# Patient Record
Sex: MALE | Race: WHITE | NOT HISPANIC OR LATINO | Employment: OTHER
[De-identification: names, ages, dates, MRNs, and addresses within clinical notes are randomized per-mention and may not be internally consistent; named-entity substitution may affect disease eponyms.]

---

## 2017-05-12 ENCOUNTER — RECORDS - HEALTHEAST (OUTPATIENT)
Dept: ADMINISTRATIVE | Facility: OTHER | Age: 67
End: 2017-05-12

## 2017-05-12 ENCOUNTER — RECORDS - HEALTHEAST (OUTPATIENT)
Dept: LAB | Facility: CLINIC | Age: 67
End: 2017-05-12

## 2017-05-12 LAB
CHOLEST SERPL-MCNC: 109 MG/DL
FASTING STATUS PATIENT QL REPORTED: YES
HDLC SERPL-MCNC: 28 MG/DL
LDLC SERPL CALC-MCNC: 55 MG/DL
TRIGL SERPL-MCNC: 128 MG/DL

## 2017-05-18 ENCOUNTER — HOSPITAL ENCOUNTER (OUTPATIENT)
Dept: CT IMAGING | Facility: CLINIC | Age: 67
Discharge: HOME | End: 2017-05-18

## 2017-05-18 DIAGNOSIS — R06.02 SOB (SHORTNESS OF BREATH): ICD-10-CM

## 2018-05-02 ENCOUNTER — TRANSFERRED RECORDS (OUTPATIENT)
Dept: HEALTH INFORMATION MANAGEMENT | Facility: CLINIC | Age: 68
End: 2018-05-02

## 2018-05-02 ENCOUNTER — RECORDS - HEALTHEAST (OUTPATIENT)
Dept: LAB | Facility: CLINIC | Age: 68
End: 2018-05-02

## 2018-05-02 LAB
ANION GAP SERPL CALCULATED.3IONS-SCNC: 10 MMOL/L (ref 5–18)
BUN SERPL-MCNC: 27 MG/DL (ref 8–28)
CALCIUM SERPL-MCNC: 9.5 MG/DL (ref 8.5–10.5)
CHLORIDE BLD-SCNC: 111 MMOL/L (ref 98–107)
CHOLEST SERPL-MCNC: 162 MG/DL
CO2 SERPL-SCNC: 23 MMOL/L (ref 22–31)
CREAT SERPL-MCNC: 0.92 MG/DL (ref 0.7–1.3)
FASTING STATUS PATIENT QL REPORTED: NO
GFR SERPL CREATININE-BSD FRML MDRD: >60 ML/MIN/1.73M2
GLUCOSE BLD-MCNC: 101 MG/DL (ref 70–125)
HDLC SERPL-MCNC: 33 MG/DL
LDLC SERPL CALC-MCNC: 106 MG/DL
POTASSIUM BLD-SCNC: 4.5 MMOL/L (ref 3.5–5)
SODIUM SERPL-SCNC: 144 MMOL/L (ref 136–145)
TRIGL SERPL-MCNC: 117 MG/DL

## 2018-05-31 ENCOUNTER — TRANSFERRED RECORDS (OUTPATIENT)
Dept: HEALTH INFORMATION MANAGEMENT | Facility: CLINIC | Age: 68
End: 2018-05-31

## 2018-06-23 ENCOUNTER — TRANSFERRED RECORDS (OUTPATIENT)
Dept: HEALTH INFORMATION MANAGEMENT | Facility: CLINIC | Age: 68
End: 2018-06-23

## 2018-06-28 ENCOUNTER — TRANSFERRED RECORDS (OUTPATIENT)
Dept: HEALTH INFORMATION MANAGEMENT | Facility: CLINIC | Age: 68
End: 2018-06-28

## 2018-08-08 NOTE — TELEPHONE ENCOUNTER
FUTURE VISIT INFORMATION      FUTURE VISIT INFORMATION:    Date: 08/15/2018     Time: 12:30 pm     Location: [facility] [place]   REFERRAL INFORMATION:    Referring provider:  Dr. Herberth Travis    Referring providers clinic:  [facility]    Reason for visit/diagnosis      FUTURE VISIT INFORMATION        RECORDS REQUESTED FROM:       Clinic name Comments Records Status Imaging Status   [facility] Requested Records on 08/08/2018 In-Coming  In-Coming                                    RECORDS STATUS      NOTES (FOR ALL VISITS) STATUS DETAILS   OFFICE NOTE from referring provider Care Everywhere 06/05/2018   OFFICE NOTE from other specialist Care Everywhere 06/15/2018   DISCHARGE SUMMARY from hospital Care Everywhere  05/01/2007   DISCHARGE REPORT from the ER N/A    OPERATIVE REPORT N/A    MEDICATION LIST Internal    IMAGING  (FOR ALL VISITS)     EMG N/A    EEG N/A    ECT N/A    MRI (HEAD, NECK, SPINE) N/A    CT (HEAD, NECK, SPINE) N/A

## 2018-08-15 ENCOUNTER — OFFICE VISIT (OUTPATIENT)
Dept: NEUROLOGY | Facility: CLINIC | Age: 68
End: 2018-08-15
Payer: COMMERCIAL

## 2018-08-15 ENCOUNTER — PRE VISIT (OUTPATIENT)
Dept: NEUROLOGY | Facility: CLINIC | Age: 68
End: 2018-08-15

## 2018-08-15 VITALS
WEIGHT: 193 LBS | SYSTOLIC BLOOD PRESSURE: 147 MMHG | OXYGEN SATURATION: 95 % | TEMPERATURE: 98.1 F | DIASTOLIC BLOOD PRESSURE: 95 MMHG | RESPIRATION RATE: 18 BRPM | HEIGHT: 69 IN | BODY MASS INDEX: 28.58 KG/M2 | HEART RATE: 74 BPM

## 2018-08-15 DIAGNOSIS — G60.3 IDIOPATHIC PROGRESSIVE POLYNEUROPATHY: Primary | ICD-10-CM

## 2018-08-15 RX ORDER — ESOMEPRAZOLE MAGNESIUM 40 MG/1
CAPSULE, DELAYED RELEASE ORAL DAILY
Refills: 2 | COMMUNITY
Start: 2017-11-10

## 2018-08-15 RX ORDER — IRBESARTAN 150 MG/1
TABLET ORAL DAILY
Refills: 2 | COMMUNITY
Start: 2018-06-27

## 2018-08-15 RX ORDER — ASPIRIN 81 MG/1
81 TABLET ORAL DAILY
COMMUNITY
Start: 2011-12-19

## 2018-08-15 ASSESSMENT — ENCOUNTER SYMPTOMS
DIZZINESS: 1
WEAKNESS: 1

## 2018-08-15 ASSESSMENT — PAIN SCALES - GENERAL: PAINLEVEL: NO PAIN (1)

## 2018-08-15 NOTE — NURSING NOTE
Chief Complaint   Patient presents with     Consult     UMP NEW HEADACHES, MILD WORD FINDING PROBLEMS, AND BALANCE     Christopher Sigala, EMT

## 2018-08-15 NOTE — MR AVS SNAPSHOT
After Visit Summary   8/15/2018    Cosme Brown    MRN: [id number]           Patient Information     Date Of Birth          1950        Visit Information        Provider Department      8/15/2018 12:30 PM Carlos Asif MD [facility] Neurology        Today's Diagnoses     Idiopathic progressive polyneuropathy    -  1       Follow-ups after your visit        Follow-up notes from your care team     Return in about 4 weeks (around 9/12/2018).      Your next 10 appointments already scheduled     Sep 12, 2018 10:00 AM CDT   (Arrive by 9:45 AM)   EMG with Mattie Erickson MD   [facility] EMG ([facility] [place])    [address] [place] [id number] [phone]           Do not use lotions or creams on the area to be tested. If you are on blood thinners (Warfarin, Coumadin, Lovenox, etc), please contact your primary care physician to check if it is safe to stop them 3 days prior to testing. If you have anxiety, please check with your referring physician to obtain anti-anxiety medication for the procedure.            Sep 26, 2018 10:00 AM CDT   (Arrive by 9:45 AM)   Return Visit with Carlos Asif MD   [facility] Neurology ([facility])    [address] [place] [id number] [phone]              Future tests that were ordered for you today     Open Future Orders        Priority Expected Expires Ordered    EMG - Needle EMG 2 Extremities (45599) Routine  8/15/2019 8/15/2018            Who to contact     Please call your clinic at [phone] to:    Ask questions about your health    Make or cancel appointments    Discuss your medicines    Learn about your test results    Speak to your doctor            Additional Information About Your Visit        Cannonball[employer] Information     Fusion-io is an electronic gateway that provides easy, online access to your medical records. With [employer], you  "can request a clinic appointment, read your test results, renew a prescription or communicate with your care team.     To sign up for [email or web address] visit the website at [email or web address]   You will be asked to enter the access code listed below, as well as some personal information. Please follow the directions to create your username and password.     Your access code is: [email or web address]  Expires: 10/30/2018  6:30 AM     Your access code will  in 90 days. If you need help or a new code, please contact your [facility] or call [phone] for assistance.        Care EveryWhere ID     This is your Care EveryWhere ID. This could be used by other organizations to access your [facility] medical records  [id number]        Your Vitals Were     Pulse Temperature Respirations Height Pulse Oximetry BMI (Body Mass Index)    74 98.1  F (36.7  C) (Oral) 18 1.753 m (5' 9\") 95% 28.5 kg/m2       Blood Pressure from Last 3 Encounters:   08/15/18 (!) 147/95    Weight from Last 3 Encounters:   08/15/18 87.5 kg (193 lb)               Primary Care Provider    None Specified       No primary provider on file.        Equal Access to Services     FERNANDEZ PRESTON : Jean romoo Soga, waaxda luqadaha, qaybta kaalmada adeegyada, leisa morley . So [facility] [phone].    ATENCIÓN: Si habla español, tiene a thomson disposición servicios gratuitos de asistencia lingüística. Llame al [phone].    We comply with applicable federal civil rights laws and [place] laws. We do not discriminate on the basis of race, color, national origin, age, disability, sex, sexual orientation, or gender identity.            Thank you!     Thank you for choosing [facility] NEUROLOGY  for your care. Our goal is always to provide you with excellent care. Hearing back from our patients is one way we can continue to improve our services. Please take a few minutes to complete the written survey that " you may receive in the mail after your visit with us. Thank you!             Your Updated Medication List - Protect others around you: Learn how to safely use, store and throw away your medicines at www.disposemymeds.org.          This list is accurate as of 8/15/18  2:31 PM.  Always use your most recent med list.                   Brand Name Dispense Instructions for use Diagnosis    aspirin 81 MG EC tablet      Take 81 mg by mouth daily        esomeprazole 40 MG CR capsule    nexIUM     daily        irbesartan 150 MG tablet    AVAPRO     daily

## 2018-08-15 NOTE — LETTER
8/15/2018       RE: Cosme Brown   [address]  [place] [address]     Dear Colleague,    Thank you for referring your patient, Cosme Brown, to the [facility] NEUROLOGY at [facility]. Please see a copy of my visit note below.    Service Date: 08/15/2018      Herberth Travis MD   [id number] [place] [address]      RE: Cosme Brown   MRN: [id number]   : 1950      Dear Herberth:      Thank you for referring Cosme Brown for neurologic consultation on 08/15/2018.  The patient is a 68-year-old man who comes with a chief complaint of imbalance as well as right occipital headache.      The patient has noted over a number of years gradual difficulty with balance.  He first noticed this when fishing in [place] for [employer].  He did tell me he suffered a fall skiing 8 years ago.  He did injure his medial left collateral ligament.  He has had some pain issues since.  He has not suffered any other significant falls, and he denied any head trauma.  He does suffer from hearing loss over the last 6 years.  He is not certain it is worse on the right compared to the left.  He has not had tinnitus.  He possibly has had 2 bouts of vertigo in the last 5-10 years that lasted less than a day.  He has not had any symptoms to suggest upper respiratory tract infections.  He denied any stroke-like symptoms including amaurosis fugax, diplopia, focal weakness, paresthesias or other episodes of true vertigo nor has he had any trouble with speech.  He has never had oscillopsia.  The patient also has had a right occipital headache that is infrequent.  He has had this intermittently and this can last up to a half a day.  He has noted that if he lies down and rests it does help.  The patient has used a firm pillow for some time that is about 6 inches deep.  He does feel somewhat fatigued at times.  He snores but not to any extent.  He has never  "fallen asleep in his office at home or at a stop sign.  Generally he does feel rested and he did note that he had a sleep study that was negative 6-7 years ago.  He has not had any recent memory problems.  He said he may have had some issues at school years ago.  He did well professionally and worked for [employer] and retired from them.  He has never had any trouble passing his urine or stool.  He has noted for over 10 years another problem involving his feet.  He described pain in his feet with mild dysesthetic sensation.  He said that if he touches his feet they feel normal to touch.  He noted that his \"arches were fallen\", the right greater than the left.  He also has had some pain when he walks over 1-2 minutes.  He did go to a foot chiropractor and he is not certain if it helped.  He has seen a podiatrist who suggested surgery.  The patient has been told he may have arthritis too involving his feet.  He can recall in retrospect having fallen arches since he was age 6.  He can recall some type of injury, probably jumping from a height.      I was able to review with him your excellent records.  The patient's chemistry profile was normal from 2017.  He had on 2018 an HDL that was low at 33 and an LDL of 106 with triglycerides of 117 and total cholesterol of 162.  He has had notes in your office that he verified that he has had previous asbestosis exposure.  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  He has had a history of prostate cancer in  and this was done with a robot type of surgery and his PSA has been negative since.  He had herpes zoster involving his face in  and has had no residual.  The patient has had a known Krueger's esophagus.  He has been hypertensive.  He did review his family history, and he noted that he had a paternal uncle who used a cane when he was old and also his dad.  His father  of kidney disease at 86 and his mother of " "chronic obstructive lung disease at 82.  His father may have had a hemorrhage and stroke that terminated his life.  He also noted he had had some type of \"heart blockage\" after a myocardial infarction.  The patient otherwise denied any other relevant family history except for a brother who has had hypertension and gout.  The patient drinks very little alcohol.  He has never smoked.  He has had a normal diet with eating fruits, vegetables and meat.  His weight has been stable.  He has had no problems with constipation or diarrhea.        CURRENT MEDICATIONS:   He continues on:   1.  Centrum Silver.     2.  Cialis.     3.  Low dose aspirin.     4.  Omeprazole.     5.  Crestor.     6.  Irbesartan.        ALLERGIES:  He has allergies listed to lovastatin and he equated this to causing pain in his feet but stopping it did not change his symptoms.      The patient did have an MRI scan of his brain that I reviewed with him.  This was done on 05/31/2018.  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  Otherwise, he had no other significant atrophy unexpected for age.  He had normal MRA of his intracranial arteries.      Neurologic examination revealed a pleasant man.  His blood pressure was 118/80 with a regular pulse.  Gait, station, cerebellar testing, except for some difficulty with tandem with the need to concentrate to do it with negative Romberg, muscle stretch reflexes which were all absent except for trace knee jerks, which are symmetric, plantar stimulation, strength testing, cranial nerve examination, superficial and cortical sensory testing are unremarkable except for decreased sensation to pinprick involving the left sole and the dorsum of his foot to the ankle as well as the same involving the right foot.  The patient had normal vibratory and position sense testing.  He does have mild cataracts on cranial nerve examination.  I could not auscultate cervical " bruits.  He had a regular cardiac rhythm without gallops or murmurs.  He had a negative Nylen-Barany test.      IMPRESSION:   1.  Longstanding history of gait disturbance with findings to suggest midline cerebellar atrophy on MRI scan.   2.  Family history of gait disturbance on his father's side.   3.  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   4.  Headache; possible migraine.     The patient is going to get involved in further therapy.  I have asked him to start balance therapy.  I did suggest that he have appropriate testing done for sensory polyneuropathy including EMG and other blood tests.  I am going to see him back for neurologic followup.        Sincerely yours,       Quyen Asif MD      I spent 1 hour with the patient.  Over 50% of the time this involved counseling and coordination of care.  A complete review of medical systems was done and a positive review is listed in the report above.         QUYEN ASIF MD             D: 2018   T: 2018   MT: GUILLERMO      Name:     EMILY NAPIER   MRN:      [id number]        Account:      [id number]   :      1950           Service Date: 08/15/2018      Document: [id number]        Again, thank you for allowing me to participate in the care of your patient.      Sincerely,    Quyen Asif MD

## 2018-08-15 NOTE — LETTER
Date:August 31, 2018      Patient was self referred, no letter generated. Do not send.        [facility] Health Information

## 2018-08-27 NOTE — PROGRESS NOTES
Service Date: 08/15/2018      Herberth Travis MD   [address] [place] [address]      RE: Cosme Brown   MRN: [id number]   : 1950      Dear Herberth:      Thank you for referring Cosme Brown for neurologic consultation on 08/15/2018.  The patient is a 68-year-old man who comes with a chief complaint of imbalance as well as right occipital headache.      The patient has noted over a number of years gradual difficulty with balance.  He first noticed this when fishing in [place] for [employer].  He did tell me he suffered a fall skiing 8 years ago.  He did injure his medial left collateral ligament.  He has had some pain issues since.  He has not suffered any other significant falls, and he denied any head trauma.  He does suffer from hearing loss over the last 6 years.  He is not certain it is worse on the right compared to the left.  He has not had tinnitus.  He possibly has had 2 bouts of vertigo in the last 5-10 years that lasted less than a day.  He has not had any symptoms to suggest upper respiratory tract infections.  He denied any stroke-like symptoms including amaurosis fugax, diplopia, focal weakness, paresthesias or other episodes of true vertigo nor has he had any trouble with speech.  He has never had oscillopsia.  The patient also has had a right occipital headache that is infrequent.  He has had this intermittently and this can last up to a half a day.  He has noted that if he lies down and rests it does help.  The patient has used a firm pillow for some time that is about 6 inches deep.  He does feel somewhat fatigued at times.  He snores but not to any extent.  He has never fallen asleep in his office at home or at a stop sign.  Generally he does feel rested and he did note that he had a sleep study that was negative 6-7 years ago.  He has not had any recent memory problems.  He said he may have had some issues at school years ago.  He did well  "professionally and worked for [employer] and retired from them.  He has never had any trouble passing his urine or stool.  He has noted for over 10 years another problem involving his feet.  He described pain in his feet with mild dysesthetic sensation.  He said that if he touches his feet they feel normal to touch.  He noted that his \"arches were fallen\", the right greater than the left.  He also has had some pain when he walks over 1-2 minutes.  He did go to a foot chiropractor and he is not certain if it helped.  He has seen a podiatrist who suggested surgery.  The patient has been told he may have arthritis too involving his feet.  He can recall in retrospect having fallen arches since he was age 6.  He can recall some type of injury, probably jumping from a height.      I was able to review with him your excellent records.  The patient's chemistry profile was normal from 2017.  He had on 2018 an HDL that was low at 33 and an LDL of 106 with triglycerides of 117 and total cholesterol of 162.  He has had notes in your office that he verified that he has had previous asbestosis exposure.  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  He has had a history of prostate cancer in  and this was done with a robot type of surgery and his PSA has been negative since.  He had herpes zoster involving his face in  and has had no residual.  The patient has had a known Krueger's esophagus.  He has been hypertensive.  He did review his family history, and he noted that he had a paternal uncle who used a cane when he was old and also his dad.  His father  of kidney disease at 86 and his mother of chronic obstructive lung disease at 82.  His father may have had a hemorrhage and stroke that terminated his life.  He also noted he had had some type of \"heart blockage\" after a myocardial infarction.  The patient otherwise denied any other relevant family history except for a " brother who has had hypertension and gout.  The patient drinks very little alcohol.  He has never smoked.  He has had a normal diet with eating fruits, vegetables and meat.  His weight has been stable.  He has had no problems with constipation or diarrhea.        CURRENT MEDICATIONS:   He continues on:   1.  Centrum Silver.     2.  Cialis.     3.  Low dose aspirin.     4.  Omeprazole.     5.  Crestor.     6.  Irbesartan.        ALLERGIES:  He has allergies listed to lovastatin and he equated this to causing pain in his feet but stopping it did not change his symptoms.      The patient did have an MRI scan of his brain that I reviewed with him.  This was done on 05/31/2018.  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  Otherwise, he had no other significant atrophy unexpected for age.  He had normal MRA of his intracranial arteries.      Neurologic examination revealed a pleasant man.  His blood pressure was 118/80 with a regular pulse.  Gait, station, cerebellar testing, except for some difficulty with tandem with the need to concentrate to do it with negative Romberg, muscle stretch reflexes which were all absent except for trace knee jerks, which are symmetric, plantar stimulation, strength testing, cranial nerve examination, superficial and cortical sensory testing are unremarkable except for decreased sensation to pinprick involving the left sole and the dorsum of his foot to the ankle as well as the same involving the right foot.  The patient had normal vibratory and position sense testing.  He does have mild cataracts on cranial nerve examination.  I could not auscultate cervical bruits.  He had a regular cardiac rhythm without gallops or murmurs.  He had a negative Nylen-Barany test.      IMPRESSION:   1.  Longstanding history of gait disturbance with findings to suggest midline cerebellar atrophy on MRI scan.   2.  Family history of gait disturbance on  his father's side.   3.  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   4.  Headache; possible migraine.     The patient is going to get involved in further therapy.  I have asked him to start balance therapy.  I did suggest that he have appropriate testing done for sensory polyneuropathy including EMG and other blood tests.  I am going to see him back for neurologic followup.        Sincerely yours,       Quyen Asif MD      I spent 1 hour with the patient.  Over 50% of the time this involved counseling and coordination of care.  A complete review of medical systems was done and a positive review is listed in the report above.         QUYEN ASIF MD             D: 2018   T: 2018   MT: GUILLERMO      Name:     EMILY NAPIER   MRN:      [id number]        Account:      [id number]   :      1950           Service Date: 08/15/2018      Document: [id number]

## 2018-09-12 ENCOUNTER — OFFICE VISIT (OUTPATIENT)
Dept: NEUROLOGY | Facility: CLINIC | Age: 68
End: 2018-09-12
Payer: COMMERCIAL

## 2018-09-12 DIAGNOSIS — M62.81 MUSCLE WEAKNESS (GENERALIZED): ICD-10-CM

## 2018-09-12 DIAGNOSIS — R26.81 GAIT INSTABILITY: Primary | ICD-10-CM

## 2018-09-12 NOTE — LETTER
9/12/2018       RE: Cosme Brown  [address]  [facility] [address]     Dear Colleague,    Thank you for referring your patient, Cosme Brown, to the [facility] EMG at [facility]. Please see a copy of my visit note below.      [facility]  Electrodiagnostic Laboratory    Nerve Conduction & EMG Report          Patient: Cosme Brown YOB: 1950  Patient ID: [id number] Age: 68 Years 5 Months  Gender: Male      History & Examination:  Referred by Dr. Asif for evaluation of the bilateral lower extremities due to complaints of gait instability.  The patient reports that 10 year progressive history of gait instability.  He reports that he cannot walk for more than half a mile without developing some discomfort in his right ankle.  He does not appreciate any significant numbness or weakness elsewhere.    He does report he has a history of a right lower extremity fracture 40 years ago.  On examination he does have decreased muscle bulk over the right EDB.  Strength testing is normal in lower extremities except for inability to extend the toes in the right foot except for the great toe.  Fasciculations were visible in both calf muscles.    Techniques:  Sensory and Motor conduction studies were done with surface recording electrodes. Limb temperature was maintained. EMG was done with a concentric needle electrode.     Results:  Sensory nerve conduction studies:  Sensory nerve conduction studies of the sural nerves bilaterally in the right superficial peroneal nerve are considered within normal limits.    Motor nerve conduction studies:  Right deep peroneal stimulation with recording over the extensor digitorum brevis elicited no response.  Stimulation with recording over the anterior tibialis was within normal limits.  Left deep peroneal stimulation and bilateral tibial nerve stimulation were within normal limits.    Right tibial F wave was  within normal limits.      Needle examination:  Needle examination was performed on both the right and left lower extremity.  During insertional activity the patient had fibrillation potentials seen in the extensor hallucis longus on the right.  Fasciculations were present in the bilateral gastrocnemius muscles and in the left leg in the posterior tibialis and tensor fascia selvin.  There was increased insertional activity in the left L5 paraspinals.  With activation some increased amplitude and duration motor unit potentials with a mild reduction in recruitment were seen in the right tibialis anterior, left tibialis anterior, left gastrocnemius, left posterior tibialis, left tensor fascia selvin and left vastus lateralis.    Interpretation:    The EMG does not confirm presence of a sensory neuropathy and those findings are within normal limits.  There are some abnormalities on needle examination that suggest possible chronic neurogenic injury that could best be explained by an L5 radiculopathy although a motor neuropathy of some sort cannot be ruled out.  The absence of the right deep peroneal response with recording over the extensor digitorum brevis could be due to local injury related to past leg fracture but again could represent some form of motor neuropathy and clinical correlation is strongly recommended.  Results were discussed personally with Dr. Asif.    EMG Physician:    Mattie Erickson MD [facility]  Department of Neurology  Pager [phone]             Sensory NCS      Nerve / Sites Rec. Site Onset Peak NP Amp Ref. PP Amp Dist Martin Ref. Temp     ms ms  V  V  V cm m/s m/s  C   R SURAL - Lat Mall 60      Calf Ankle 3.33 4.38 7.5 5.0 10.1 14 42.0 38.0 31.3   L SURAL - Lat Mall 60      Calf Ankle 3.54 4.48 9.6 5.0 12.4 14 39.5 38.0 31.6   R SUP PERONEAL      Lat Leg Hill 3.02 4.01 5.7  8.7 12.5 41.4 38.0 31.9       Motor NCS      Nerve / Sites Rec. Site Lat Ref. Amp Ref. Rel Amp Dist Martin Ref. Dur. Area Temp.      ms ms mV mV % cm m/s m/s ms %  C   R DEEP PERONEAL - EDB 60      Ankle EDB NR 6.00 NR 2.0 NR 8   NR NR 31.5   L DEEP PERONEAL - EDB 60      Ankle EDB 4.27 6.00 5.5 2.0 100 8   6.77 100 31.6   R TIBIAL - AH      Ankle AH 3.85 6.00 7.1 4.0 100 8   6.98 100 31.4      Pop Fos AH 14.53  4.9  68.6 44 41.2 38.0 8.54 91.5 31.4   L TIBIAL - AH      Ankle AH 4.32 6.00 6.9 4.0 100 8   7.24 100 29.4   R PERONEAL - Tib Ant      Fib Head Tib Ant 2.92  5.1  100    10.73 100 31.5      Knee Tib Ant 4.48  5.0  98.8 7 44.8  10.68 98.4 31.5       F  Wave      Nerve Min F Lat Max F Lat Mean FLat Temp.    ms ms ms  C   R TIBIAL 58.12 72.81 64.48 31.4       EMG Summary Table     Spontaneous MUAP Recruitment    IA Fib/PSW Fasc H.F. Amp Dur. PPP Pattern   R. TIB ANTERIOR N None None None 1+ 1+ None Mild Reduced   R. GASTROCN (MED) N None 1+ None N N None Normal   R. EXT ISAAC LONG N 1+ None None N N None Normal   R. VAST LATERALIS N None None None N N None Normal   R. GLUTEUS MED N None None None N N None Normal   R. GLUTEUS MAX N None None None N N None Normal   L. TIB ANTERIOR N None None None 1+ 1+ None Mild Reduced   L. GASTROCN (MED) N None 1+ None 1+ 1+ None Normal   L. TIB POSTERIOR N None 1+ None 1+ 1+ None Mild Reduced   L. T FASCIA SANDRA N None 1+ None 1+ 1+ None Mild Reduced   L. VAST LATERALIS N None None None 1+ 1+ None Normal   L. GLUTEUS MAX N None None None N N None Normal   L. L5 PSP Increased None None None N N None Normal

## 2018-09-12 NOTE — PROGRESS NOTES
[facility]  Electrodiagnostic Laboratory    Nerve Conduction & EMG Report          Patient: Cosme Brown YOB: 1950  Patient ID: [id number] Age: 68 Years 5 Months  Gender: Male      History & Examination:  Referred by Dr. Asif for evaluation of the bilateral lower extremities due to complaints of gait instability.  The patient reports that 10 year progressive history of gait instability.  He reports that he cannot walk for more than half a mile without developing some discomfort in his right ankle.  He does not appreciate any significant numbness or weakness elsewhere.    He does report he has a history of a right lower extremity fracture 40 years ago.  On examination he does have decreased muscle bulk over the right EDB.  Strength testing is normal in lower extremities except for inability to extend the toes in the right foot except for the great toe.  Fasciculations were visible in both calf muscles.    Techniques:  Sensory and Motor conduction studies were done with surface recording electrodes. Limb temperature was maintained. EMG was done with a concentric needle electrode.     Results:  Sensory nerve conduction studies:  Sensory nerve conduction studies of the sural nerves bilaterally in the right superficial peroneal nerve are considered within normal limits.    Motor nerve conduction studies:  Right deep peroneal stimulation with recording over the extensor digitorum brevis elicited no response.  Stimulation with recording over the anterior tibialis was within normal limits.  Left deep peroneal stimulation and bilateral tibial nerve stimulation were within normal limits.    Right tibial F wave was within normal limits.      Needle examination:  Needle examination was performed on both the right and left lower extremity.  During insertional activity the patient had fibrillation potentials seen in the extensor hallucis longus on the right.  Fasciculations were present in  the bilateral gastrocnemius muscles and in the left leg in the posterior tibialis and tensor fascia selvin.  There was increased insertional activity in the left L5 paraspinals.  With activation some increased amplitude and duration motor unit potentials with a mild reduction in recruitment were seen in the right tibialis anterior, left tibialis anterior, left gastrocnemius, left posterior tibialis, left tensor fascia selvin and left vastus lateralis.    Interpretation:    The EMG does not confirm presence of a sensory neuropathy and those findings are within normal limits.  There are some abnormalities on needle examination that suggest possible chronic neurogenic injury that could best be explained by an L5 radiculopathy although a motor neuropathy of some sort cannot be ruled out.  The absence of the right deep peroneal response with recording over the extensor digitorum brevis could be due to local injury related to past leg fracture but again could represent some form of motor neuropathy and clinical correlation is strongly recommended.  Results were discussed personally with Dr. Asif.    EMG Physician:    Mattie Erickson MD [facility]  Department of Neurology  Pager [phone]             Sensory NCS      Nerve / Sites Rec. Site Onset Peak NP Amp Ref. PP Amp Dist Martin Ref. Temp     ms ms  V  V  V cm m/s m/s  C   R SURAL - Lat Mall 60      Calf Ankle 3.33 4.38 7.5 5.0 10.1 14 42.0 38.0 31.3   L SURAL - Lat Mall 60      Calf Ankle 3.54 4.48 9.6 5.0 12.4 14 39.5 38.0 31.6   R SUP PERONEAL      Lat Leg Hill 3.02 4.01 5.7  8.7 12.5 41.4 38.0 31.9       Motor NCS      Nerve / Sites Rec. Site Lat Ref. Amp Ref. Rel Amp Dist Martin Ref. Dur. Area Temp.     ms ms mV mV % cm m/s m/s ms %  C   R DEEP PERONEAL - EDB 60      Ankle EDB NR 6.00 NR 2.0 NR 8   NR NR 31.5   L DEEP PERONEAL - EDB 60      Ankle EDB 4.27 6.00 5.5 2.0 100 8   6.77 100 31.6   R TIBIAL - AH      Ankle AH 3.85 6.00 7.1 4.0 100 8   6.98 100 31.4      Pop Fos AH  14.53  4.9  68.6 44 41.2 38.0 8.54 91.5 31.4   L TIBIAL - AH      Ankle AH 4.32 6.00 6.9 4.0 100 8   7.24 100 29.4   R PERONEAL - Tib Ant      Fib Head Tib Ant 2.92  5.1  100    10.73 100 31.5      Knee Tib Ant 4.48  5.0  98.8 7 44.8  10.68 98.4 31.5       F  Wave      Nerve Min F Lat Max F Lat Mean FLat Temp.    ms ms ms  C   R TIBIAL 58.12 72.81 64.48 31.4       EMG Summary Table     Spontaneous MUAP Recruitment    IA Fib/PSW Fasc H.F. Amp Dur. PPP Pattern   R. TIB ANTERIOR N None None None 1+ 1+ None Mild Reduced   R. GASTROCN (MED) N None 1+ None N N None Normal   R. EXT ISAAC LONG N 1+ None None N N None Normal   R. VAST LATERALIS N None None None N N None Normal   R. GLUTEUS MED N None None None N N None Normal   R. GLUTEUS MAX N None None None N N None Normal   L. TIB ANTERIOR N None None None 1+ 1+ None Mild Reduced   L. GASTROCN (MED) N None 1+ None 1+ 1+ None Normal   L. TIB POSTERIOR N None 1+ None 1+ 1+ None Mild Reduced   L. T FASCIA SANDRA N None 1+ None 1+ 1+ None Mild Reduced   L. VAST LATERALIS N None None None 1+ 1+ None Normal   L. GLUTEUS MAX N None None None N N None Normal   L. L5 PSP Increased None None None N N None Normal

## 2018-09-12 NOTE — MR AVS SNAPSHOT
After Visit Summary   2018    Cosme Brown    MRN: [id number]           Patient Information     Date Of Birth          1950        Visit Information        Provider Department      2018 10:00 AM Mattie Erickson MD [facility] EMG        Today's Diagnoses     Gait instability    -  1    Muscle weakness (generalized)           Follow-ups after your visit        Your next 10 appointments already scheduled     Sep 26, 2018 10:00 AM CDT   (Arrive by 9:45 AM)   Return Visit with Carlos Asif MD   [facility] Neurology ([facility])    [address] [place] [id number] [phone]              Future tests that were ordered for you today     Open Future Orders        Priority Expected Expires Ordered    HC NEEDLE EMG EA EXTREMTY W/PARASPINAL AREA COMPLETE Routine  2019            Who to contact     Please call your clinic at [phone] to:    Ask questions about your health    Make or cancel appointments    Discuss your medicines    Learn about your test results    Speak to your doctor            Additional Information About Your Visit        [employer] Information     [employer] is an electronic gateway that provides easy, online access to your medical records. With [employer]ar, you can request a clinic appointment, read your test results, renew a prescription or communicate with your care team.     To sign up for [employer]ar visit the website at [email or web address]   You will be asked to enter the access code listed below, as well as some personal information. Please follow the directions to create your username and password.     Your access code is: [email or web address]  Expires: 10/30/2018  6:30 AM     Your access code will  in 90 days. If you need help or a new code, please contact your [facility] or call [phone] for assistance.        Care EveryWhere ID     This is your  Care EveryWhere ID. This could be used by other organizations to access your [place] medical records  [id number]         Blood Pressure from Last 3 Encounters:   08/15/18 (!) 147/95    Weight from Last 3 Encounters:   08/15/18 87.5 kg (193 lb)              We Performed the Following     HC NCS MOTOR W OR W/O F-WAVE, 7 OR 8        Primary Care Provider    None Specified       No primary provider on file.        Equal Access to Services     [facility] : Hadii aad ku haddanielao Soidalmisali, waaxda luqadaha, qaybta kaalmada adejeremiyada, waxay idiin haybuddyomega stevensonduaneja morley . So [facility] [phone].    ATENCIÓN: Si habla español, tiene a thomson disposición servicios gratuitos de asistencia lingüística. Llame al [phone].    We comply with applicable federal civil rights laws and [place] laws. We do not discriminate on the basis of race, color, national origin, age, disability, sex, sexual orientation, or gender identity.            Thank you!     Thank you for choosing [facility]  for your care. Our goal is always to provide you with excellent care. Hearing back from our patients is one way we can continue to improve our services. Please take a few minutes to complete the written survey that you may receive in the mail after your visit with us. Thank you!             Your Updated Medication List - Protect others around you: Learn how to safely use, store and throw away your medicines at www.disposemymeds.org.          This list is accurate as of 9/12/18  6:42 PM.  Always use your most recent med list.                   Brand Name Dispense Instructions for use Diagnosis    aspirin 81 MG EC tablet      Take 81 mg by mouth daily        esomeprazole 40 MG CR capsule    nexIUM     daily        irbesartan 150 MG tablet    AVAPRO     daily

## 2018-09-14 ENCOUNTER — TELEPHONE (OUTPATIENT)
Dept: NEUROLOGY | Facility: CLINIC | Age: 68
End: 2018-09-14

## 2018-09-14 DIAGNOSIS — R94.131 ABNORMAL EMG: Primary | ICD-10-CM

## 2018-09-24 ENCOUNTER — HOSPITAL ENCOUNTER (OUTPATIENT)
Dept: MRI IMAGING | Facility: CLINIC | Age: 68
Discharge: HOME | End: 2018-09-24
Attending: PSYCHIATRY & NEUROLOGY | Admitting: PSYCHIATRY & NEUROLOGY
Payer: MEDICARE

## 2018-09-24 DIAGNOSIS — R94.131 ABNORMAL EMG: ICD-10-CM

## 2018-09-24 PROCEDURE — 72148 MRI LUMBAR SPINE W/O DYE: CPT

## 2018-09-26 ENCOUNTER — OFFICE VISIT (OUTPATIENT)
Dept: NEUROLOGY | Facility: CLINIC | Age: 68
End: 2018-09-26
Payer: COMMERCIAL

## 2018-09-26 VITALS
OXYGEN SATURATION: 95 % | BODY MASS INDEX: 30.87 KG/M2 | DIASTOLIC BLOOD PRESSURE: 93 MMHG | HEART RATE: 115 BPM | TEMPERATURE: 98 F | WEIGHT: 196.7 LBS | HEIGHT: 67 IN | SYSTOLIC BLOOD PRESSURE: 140 MMHG

## 2018-09-26 DIAGNOSIS — R94.131 ABNORMAL EMG: ICD-10-CM

## 2018-09-26 DIAGNOSIS — C61 PROSTATE CANCER (H): ICD-10-CM

## 2018-09-26 DIAGNOSIS — R29.898 LEG WEAKNESS, BILATERAL: ICD-10-CM

## 2018-09-26 DIAGNOSIS — R20.2 PARESTHESIA: ICD-10-CM

## 2018-09-26 DIAGNOSIS — E55.9 VITAMIN D DEFICIENCY: ICD-10-CM

## 2018-09-26 DIAGNOSIS — R29.898 LEG WEAKNESS, BILATERAL: Primary | ICD-10-CM

## 2018-09-26 LAB
DEPRECATED CALCIDIOL+CALCIFEROL SERPL-MC: 16 UG/L (ref 20–75)
T4 FREE SERPL-MCNC: 0.93 NG/DL (ref 0.76–1.46)
TSH SERPL DL<=0.005 MIU/L-ACNC: 0.72 MU/L (ref 0.4–4)
VIT B12 SERPL-MCNC: 693 PG/ML (ref 193–986)

## 2018-09-26 ASSESSMENT — PAIN SCALES - GENERAL: PAINLEVEL: NO PAIN (0)

## 2018-09-26 NOTE — LETTER
2018       RE: Cosme Brown   [address] [place] [address]     Dear Colleague,    Thank you for referring your patient, Cosme Brown, to the [facility] NEUROLOGY at [facility]. Please see a copy of my visit note below.    Service Date: 2018      Herberth Travis MD   [id number] [facility]   [place] [address]      RE: Cosme Brown   MRN: [id number]   : 1950      Dear Herberth:      This is in regard to follow up on Cosme Brown.  The patient returns today with a chief complaint of imbalance as well as sensory disturbance involving his feet and right occipital headache.      The patient underwent testing at my suggestion.  His EMG study done by Dr. Mattie Erickson here at [facility] showed that he had changes to suggest a possible chronic neurogenic injury best explained by an L5 radiculopathy.  She could not rule out a motor neuropathy.  The patient did have increased insertional activity in the left L5 paraspinal muscles and also had abnormality noted with larger units in the right tibialis anterior, left tibialis anterior, left gastrocnemius, left posterior tibialis, and left tensor fascia selvin and left vastus lateralis.  I went over the actual study with him.  This did suggest that he could have a change in the lumbar spine to explain some of his symptoms.  I did leave a message with his phone concerning these findings and reviewed them again.  This led to the patient getting an MRI scan of the lumbar spine here at [facility].  It showed some abnormality, but it was mild involving the L4-L5 foramina bilaterally.  He did not have any areas to suggest significant stenosis.  With these MRI scan findings not probably explaining his EMG findings, I did suggest that he go ahead now and have MRI scans of the cervical and thoracic spine.  He did bring up issues with me concerning his concern with insect exposure with camping,  especially a number of years ago when he was spending a lot of time outdoors near [place].  He can recall 1 episode 10-12 years ago where he had low-grade flu-like symptoms after camping there after mosquito bites.  He had concerns about different viral etiologies to neurologic symptoms.  I reassured him that he probably did not have ongoing symptoms from any type of insect related illness from that time.  Hopefully, I was able to reassure him.  He is aware that exercise will probably help his balance and he is willing to go ahead with it.  I have talked with him about vascular risk factors that he has had and trying to get his LDL cholesterol to a lower level.  I have suggested he now start eating Benecol, a plant-based margin and consuming kiwi on a frequent basis.  I explained how these dietary interventions could lower his LDL.  The patient has no serologic risk factors.  I did ask that he have further blood studies and reviewed with him other studies that had been done here at [facility] that should be available through the Epic website.  I made sure he had copies.  I reviewed with him his consultation of 08/15/2018 and he said it was correct.  He is going to return for followup after undergoing further MRI scan and blood studies.      I explained to the patient.  I do not have an exact diagnosis.  I felt some of his EMG changes could relate to L5 radiculopathies.  The patient does not probably have a surgical type of finding involving foraminal narrowing to explain his leg symptoms.      Thank you for allowing me to see this patient.       Sincerely       Quyen Asif MD       I spent 20 minutes with the patient today.  Over 50% of the time this involved counseling and coordination of care         QUYEN ASIF MD             D: 2018   T: 10/01/2018   MT: GUILLERMO      Name:     EMILY NAPIER   MRN:      [id number]        Account:      [id number]   :      1950           Service Date:  09/26/2018      Document: [id number]       Again, thank you for allowing me to participate in the care of your patient.      Sincerely,    Carlos Asif MD

## 2018-09-26 NOTE — NURSING NOTE
Chief Complaint   Patient presents with     RECHECK     [facility] RETURN EMG RESULTS       Karmen Campoverde, EMT

## 2018-09-26 NOTE — MR AVS SNAPSHOT
After Visit Summary   9/26/2018    Cosme Brown    MRN: [id number]           Patient Information     Date Of Birth          1950        Visit Information        Provider Department      9/26/2018 10:00 AM Carlos Asif MD  Health Neurology        Today's Diagnoses     Leg weakness, bilateral    -  1    Paresthesia        Abnormal EMG        Prostate cancer (H)        Vitamin D deficiency           Follow-ups after your visit        Follow-up notes from your care team     Return in about 4 weeks (around 10/24/2018).      Your next 10 appointments already scheduled     Oct 04, 2018  1:00 PM CDT   MR CERVICAL SPINE W/O & W CONTRAST with UUMR2   [facility], [place], MRI ([facility], [facility])    [address] [facility] [address] [address] [phone]           How do I prepare for my exam? (Food and drink instructions) **If you will be receiving sedation or general anesthesia, please see special notes below.**  How do I prepare for my exam? (Other instructions) Take your medicines as usual, unless your doctor tells you not to. You may or may not receive intravenous (IV) contrast for this exam pending the discretion of the Radiologist.  You do not need to do anything special to prepare.  **If you will be receiving sedation or general anesthesia, please see special notes below.**  What should I wear: The MRI machine uses a strong magnet. Please wear clothes without metal (snaps, zippers). A sweatsuit works well, or we may give you a hospital gown. Please remove any body piercings and hair extensions before you arrive. You will also remove watches, jewelry, hairpins, wallets, dentures, partial dental plates and hearing aids. You may wear contact lenses, and you may be able to wear your rings. We have a safe place to keep your personal items, but it is safer to leave them at home.  How long does the exam take: Most tests take 30 to 60 minutes.   HOWEVER, IF YOUR DOCTOR PRESCRIBES ANESTHESIA please plan on spending four to five hours in the recovery room.  What should I bring:  Bring a list of your current medicines to your exam (including vitamins, minerals and over-the-counter drugs).  Do I need a :  **If you will be receiving sedation or general anesthesia, please see special notes below.**  What should I do after the exam: No Restrictions, You may resume normal activities.  What is this test: MRI (magnetic resonance imaging) uses a strong magnet and radio waves to look inside the body. An MRA (magnetic resonance angiogram) does the same thing, but it lets us look at your blood vessels. A computer turns the radio waves into pictures showing cross sections of the body, much like slices of bread. This helps us see any problems more clearly. You may receive fluid (called  contrast ) before or during your scan. The fluid helps us see the pictures better. We give the fluid through an IV (small needle in your arm).  Who should I call with questions:  Please call the Imaging Department at your exam site with any questions. Directions, parking instructions, and other information is available on our website, [email or web address].  How do I prepare if I m having sedation or anesthesia? **IMPORTANT** THE INSTRUCTIONS BELOW ARE ONLY FOR THOSE PATIENTS WHO HAVE BEEN TOLD THEY WILL RECEIVE SEDATION OR GENERAL ANESTHESIA DURING THEIR MRI PROCEDURE:  IF YOU WILL RECEIVE SEDATION (take medicine to help you relax during your exam): You must get the medicine from your doctor before you arrive. Bring the medicine to the exam. Do not take it at home. Arrive one hour early. Bring someone who can take you home after the test. Your medicine will make you sleepy. After the exam, you may not drive, take a bus or take a taxi by yourself. No eating 8 hours before your exam. You may have clear liquids up until 4 hours before your exam. (Clear liquids include water, clear tea,  black coffee and fruit juice without pulp.)  IF YOU WILL RECEIVE ANESTHESIA (be asleep for your exam): Arrive 1 1/2 hours early. Bring someone who can take you home after the test. You may not drive, take a bus or take a taxi by yourself. No eating 8 hours before your exam. You may have clear liquids up until 4 hours before your exam. (Clear liquids include water, clear tea, black coffee and fruit juice without pulp.)            Oct 04, 2018  2:00 PM CDT   MR THORACIC SPINE W/O & W CONTRAST with UUMR2   [facility], [place], MRI ([facility], [facility])    [address] [id number] [phone]           How do I prepare for my exam? (Food and drink instructions) **If you will be receiving sedation or general anesthesia, please see special notes below.**  How do I prepare for my exam? (Other instructions) Take your medicines as usual, unless your doctor tells you not to. You may or may not receive intravenous (IV) contrast for this exam pending the discretion of the Radiologist.  You do not need to do anything special to prepare.  **If you will be receiving sedation or general anesthesia, please see special notes below.**  What should I wear: The MRI machine uses a strong magnet. Please wear clothes without metal (snaps, zippers). A sweatsuit works well, or we may give you a hospital gown. Please remove any body piercings and hair extensions before you arrive. You will also remove watches, jewelry, hairpins, wallets, dentures, partial dental plates and hearing aids. You may wear contact lenses, and you may be able to wear your rings. We have a safe place to keep your personal items, but it is safer to leave them at home.  How long does the exam take: Most tests take 30 to 60 minutes.  HOWEVER, IF YOUR DOCTOR PRESCRIBES ANESTHESIA please plan on spending four to five hours in the recovery room.  What should I bring:  Bring a list of your current medicines to  your exam (including vitamins, minerals and over-the-counter drugs).  Do I need a :  **If you will be receiving sedation or general anesthesia, please see special notes below.**  What should I do after the exam: No Restrictions, You may resume normal activities.  What is this test: MRI (magnetic resonance imaging) uses a strong magnet and radio waves to look inside the body. An MRA (magnetic resonance angiogram) does the same thing, but it lets us look at your blood vessels. A computer turns the radio waves into pictures showing cross sections of the body, much like slices of bread. This helps us see any problems more clearly. You may receive fluid (called  contrast ) before or during your scan. The fluid helps us see the pictures better. We give the fluid through an IV (small needle in your arm).  Who should I call with questions:  Please call the Imaging Department at your exam site with any questions. Directions, parking instructions, and other information is available on our website, [email or web address].  How do I prepare if I m having sedation or anesthesia? **IMPORTANT** THE INSTRUCTIONS BELOW ARE ONLY FOR THOSE PATIENTS WHO HAVE BEEN TOLD THEY WILL RECEIVE SEDATION OR GENERAL ANESTHESIA DURING THEIR MRI PROCEDURE:  IF YOU WILL RECEIVE SEDATION (take medicine to help you relax during your exam): You must get the medicine from your doctor before you arrive. Bring the medicine to the exam. Do not take it at home. Arrive one hour early. Bring someone who can take you home after the test. Your medicine will make you sleepy. After the exam, you may not drive, take a bus or take a taxi by yourself. No eating 8 hours before your exam. You may have clear liquids up until 4 hours before your exam. (Clear liquids include water, clear tea, black coffee and fruit juice without pulp.)  IF YOU WILL RECEIVE ANESTHESIA (be asleep for your exam): Arrive 1 1/2 hours early. Bring someone who can take you home after the  "test. You may not drive, take a bus or take a taxi by yourself. No eating 8 hours before your exam. You may have clear liquids up until 4 hours before your exam. (Clear liquids include water, clear tea, black coffee and fruit juice without pulp.)            Nov 08, 2018  9:30 AM CST   (Arrive by 9:15 AM)   Return Visit with Carlos Asif MD   [facility] Neurology ([facility])    [address] [place] [address] [phone]              Future tests that were ordered for you today     Open Future Orders        Priority Expected Expires Ordered    TSH Routine 9/26/2018 9/26/2019 9/26/2018    T4 free Routine 9/26/2018 9/26/2019 9/26/2018    Vitamin D Deficiency Routine 9/26/2018 9/26/2019 9/26/2018    Protein Immunofixation Serum Routine 9/26/2018 9/26/2019 9/26/2018    Paraneoplastic antibody Routine 9/26/2018 9/26/2019 9/26/2018    Methylmalonic acid Routine 9/26/2018 9/26/2019 9/26/2018    Vitamin B12 Routine 9/26/2018 9/26/2019 9/26/2018    MRI Cervical spine w & w/o contrast Routine  9/26/2019 9/26/2018    MRI Thoracic spine w & w/o contrast Routine  9/26/2019 9/26/2018            Who to contact     Please call your clinic at [phone] to:    Ask questions about your health    Make or cancel appointments    Discuss your medicines    Learn about your test results    Speak to your doctor            Additional Information About Your Visit        Care EveryWhere ID     This is your Care EveryWhere ID. This could be used by other organizations to access your [place] medical records  [id number]        Your Vitals Were     Pulse Temperature Height Pulse Oximetry BMI (Body Mass Index)       115 98  F (36.7  C) (Oral) 1.702 m (5' 7\") 95% 30.81 kg/m2        Blood Pressure from Last 3 Encounters:   09/26/18 (!) 140/93   08/15/18 (!) 147/95    Weight from Last 3 Encounters:   09/26/18 89.2 kg (196 lb 11.2 oz)   08/15/18 87.5 kg (193 lb)               Primary " Care Provider    None Specified       No primary provider on file.        Equal Access to Services     FERNANDEZ PRESTON : Hadii jin Olivares, alejandra jackson, leisa morfin. So [facility] [place] [phone].    ATENCIÓN: Si habla español, tiene a thomson disposición servicios gratuitos de asistencia lingüística. Llame al [phone].    We comply with applicable federal civil rights laws and [place] laws. We do not discriminate on the basis of race, color, national origin, age, disability, sex, sexual orientation, or gender identity.            Thank you!     Thank you for choosing [facility] NEUROLOGY  for your care. Our goal is always to provide you with excellent care. Hearing back from our patients is one way we can continue to improve our services. Please take a few minutes to complete the written survey that you may receive in the mail after your visit with us. Thank you!             Your Updated Medication List - Protect others around you: Learn how to safely use, store and throw away your medicines at www.disposemymeds.org.          This list is accurate as of 9/26/18 11:20 AM.  Always use your most recent med list.                   Brand Name Dispense Instructions for use Diagnosis    aspirin 81 MG EC tablet      Take 81 mg by mouth daily        esomeprazole 40 MG CR capsule    nexIUM     daily        irbesartan 150 MG tablet    AVAPRO     daily

## 2018-09-26 NOTE — LETTER
Date:October 4, 2018      Patient was self referred, no letter generated. Do not send.        [facility] Health Information

## 2018-09-27 LAB
IGA SERPL-MCNC: 74 MG/DL (ref 70–380)
IGG SERPL-MCNC: 771 MG/DL (ref 695–1620)
IGM SERPL-MCNC: 27 MG/DL (ref 60–265)
PROT PATTERN SERPL IFE-IMP: ABNORMAL

## 2018-09-29 LAB — METHYLMALONATE SERPL-SCNC: 0.36 UMOL/L (ref 0–0.4)

## 2018-10-01 NOTE — PROGRESS NOTES
Service Date: 2018      Herberth Travis MD   [id number] [place] [address]      RE: Cosme Brown   MRN: [id number]   : 1950      Dear Herberth:      This is in regard to follow up on Cosme Brown.  The patient returns today with a chief complaint of imbalance as well as sensory disturbance involving his feet and right occipital headache.      The patient underwent testing at my suggestion.  His EMG study done by Dr. Mattie Erickson here at [facility] showed that he had changes to suggest a possible chronic neurogenic injury best explained by an L5 radiculopathy.  She could not rule out a motor neuropathy.  The patient did have increased insertional activity in the left L5 paraspinal muscles and also had abnormality noted with larger units in the right tibialis anterior, left tibialis anterior, left gastrocnemius, left posterior tibialis, and left tensor fascia selvin and left vastus lateralis.  I went over the actual study with him.  This did suggest that he could have a change in the lumbar spine to explain some of his symptoms.  I did leave a message with his phone concerning these findings and reviewed them again.  This led to the patient getting an MRI scan of the lumbar spine here at [facility].  It showed some abnormality, but it was mild involving the L4-L5 foramina bilaterally.  He did not have any areas to suggest significant stenosis.  With these MRI scan findings not probably explaining his EMG findings, I did suggest that he go ahead now and have MRI scans of the cervical and thoracic spine.  He did bring up issues with me concerning his concern with insect exposure with camping, especially a number of years ago when he was spending a lot of time outdoors near [place].  He can recall 1 episode 10-12 years ago where he had low-grade flu-like symptoms after camping there after mosquito bites.  He had concerns about different viral etiologies to neurologic symptoms.  I  reassured him that he probably did not have ongoing symptoms from any type of insect related illness from that time.  Hopefully, I was able to reassure him.  He is aware that exercise will probably help his balance and he is willing to go ahead with it.  I have talked with him about vascular risk factors that he has had and trying to get his LDL cholesterol to a lower level.  I have suggested he now start eating Benecol, a plant-based margin and consuming kiwi on a frequent basis.  I explained how these dietary interventions could lower his LDL.  The patient has no serologic risk factors.  I did ask that he have further blood studies and reviewed with him other studies that had been done here at [facility] that should be available through the Epic website.  I made sure he had copies.  I reviewed with him his consultation of 08/15/2018 and he said it was correct.  He is going to return for followup after undergoing further MRI scan and blood studies.      I explained to the patient.  I do not have an exact diagnosis.  I felt some of his EMG changes could relate to L5 radiculopathies.  The patient does not probably have a surgical type of finding involving foraminal narrowing to explain his leg symptoms.      Thank you for allowing me to see this patient.       Sincerely       Quyen Asif MD       I spent 20 minutes with the patient today.  Over 50% of the time this involved counseling and coordination of care         QUYEN ASIF MD             D: 2018   T: 10/01/2018   MT: GUILLERMO      Name:     EMILY NAPIER   MRN:      [id number]        Account:      [id number]   :      1950           Service Date: 2018      Document: [id number]

## 2018-10-03 DIAGNOSIS — E55.9 VITAMIN D DEFICIENCY: Primary | ICD-10-CM

## 2018-10-03 RX ORDER — ERGOCALCIFEROL 1.25 MG/1
50000 CAPSULE, LIQUID FILLED ORAL
Qty: 8 CAPSULE | Refills: 0 | Status: SHIPPED | OUTPATIENT
Start: 2018-10-03 | End: 2018-11-22

## 2018-10-04 ENCOUNTER — CARE COORDINATION (OUTPATIENT)
Dept: NEUROLOGY | Facility: CLINIC | Age: 68
End: 2018-10-04

## 2018-10-04 ENCOUNTER — HOSPITAL ENCOUNTER (OUTPATIENT)
Dept: MRI IMAGING | Facility: CLINIC | Age: 68
End: 2018-10-04
Attending: PSYCHIATRY & NEUROLOGY
Payer: MEDICARE

## 2018-10-04 ENCOUNTER — HOSPITAL ENCOUNTER (OUTPATIENT)
Dept: MRI IMAGING | Facility: CLINIC | Age: 68
Discharge: HOME | End: 2018-10-04
Attending: PSYCHIATRY & NEUROLOGY | Admitting: PSYCHIATRY & NEUROLOGY
Payer: MEDICARE

## 2018-10-04 DIAGNOSIS — R94.131 ABNORMAL EMG: ICD-10-CM

## 2018-10-04 DIAGNOSIS — R20.2 PARESTHESIA: ICD-10-CM

## 2018-10-04 DIAGNOSIS — C61 PROSTATE CANCER (H): ICD-10-CM

## 2018-10-04 DIAGNOSIS — E55.9 VITAMIN D DEFICIENCY: ICD-10-CM

## 2018-10-04 DIAGNOSIS — R29.898 LEG WEAKNESS, BILATERAL: ICD-10-CM

## 2018-10-04 LAB
CREAT BLD-MCNC: 1 MG/DL (ref 0.66–1.25)
GFR SERPL CREATININE-BSD FRML MDRD: 74 ML/MIN/1.7M2

## 2018-10-04 PROCEDURE — 82565 ASSAY OF CREATININE: CPT

## 2018-10-04 PROCEDURE — A9585 GADOBUTROL INJECTION: HCPCS | Performed by: PSYCHIATRY & NEUROLOGY

## 2018-10-04 PROCEDURE — 25500064 ZZH RX 255 OP 636: Performed by: PSYCHIATRY & NEUROLOGY

## 2018-10-04 PROCEDURE — 72156 MRI NECK SPINE W/O & W/DYE: CPT

## 2018-10-04 PROCEDURE — 72157 MRI CHEST SPINE W/O & W/DYE: CPT

## 2018-10-04 RX ORDER — GADOBUTROL 604.72 MG/ML
10 INJECTION INTRAVENOUS ONCE
Status: COMPLETED | OUTPATIENT
Start: 2018-10-04 | End: 2018-10-04

## 2018-10-04 RX ADMIN — GADOBUTROL 10 ML: 604.72 INJECTION INTRAVENOUS at 14:35

## 2018-10-04 NOTE — PROGRESS NOTES
----- Message -----      From: Carlos Asif MD      Sent: 10/3/2018   7:44 AM        To: Sharon Tello RN     Vit d level 16; will order replacement   =====================================================================    10/4/18: called and left voicemail message for patient with above message from Dr. Asif. Left our contact info incase he has any questions/concerns.

## 2018-10-05 ENCOUNTER — CARE COORDINATION (OUTPATIENT)
Dept: NEUROLOGY | Facility: CLINIC | Age: 68
End: 2018-10-05

## 2018-10-05 LAB — PNP ABY SERUM: NORMAL

## 2018-10-05 NOTE — PROGRESS NOTES
Carlos Asif MD McAllister, Angela, RN                   Tell him mri's show some spondylosis in c spine, but no spinal cord isssue or cause for his sx's; keep follow up appt         10/5/18: called and left voicemail message for patient with above message from Dr. Asif. Left contact info incase he has any questions/concerns.

## 2018-10-17 ENCOUNTER — RECORDS - HEALTHEAST (OUTPATIENT)
Dept: LAB | Facility: CLINIC | Age: 68
End: 2018-10-17

## 2018-10-17 LAB
ANION GAP SERPL CALCULATED.3IONS-SCNC: 14 MMOL/L (ref 5–18)
BUN SERPL-MCNC: 16 MG/DL (ref 8–28)
CALCIUM SERPL-MCNC: 9.6 MG/DL (ref 8.5–10.5)
CHLORIDE BLD-SCNC: 105 MMOL/L (ref 98–107)
CHOLEST SERPL-MCNC: 122 MG/DL
CO2 SERPL-SCNC: 21 MMOL/L (ref 22–31)
CREAT SERPL-MCNC: 1.02 MG/DL (ref 0.7–1.3)
FASTING STATUS PATIENT QL REPORTED: NO
GFR SERPL CREATININE-BSD FRML MDRD: >60 ML/MIN/1.73M2
GLUCOSE BLD-MCNC: 99 MG/DL (ref 70–125)
HDLC SERPL-MCNC: 34 MG/DL
LDLC SERPL CALC-MCNC: 58 MG/DL
POTASSIUM BLD-SCNC: 3.8 MMOL/L (ref 3.5–5)
SODIUM SERPL-SCNC: 140 MMOL/L (ref 136–145)
TRIGL SERPL-MCNC: 148 MG/DL

## 2018-10-18 LAB — 25(OH)D3 SERPL-MCNC: 26.8 NG/ML (ref 30–80)

## 2018-11-08 ENCOUNTER — OFFICE VISIT (OUTPATIENT)
Dept: NEUROLOGY | Facility: CLINIC | Age: 68
End: 2018-11-08
Payer: COMMERCIAL

## 2018-11-08 VITALS
SYSTOLIC BLOOD PRESSURE: 148 MMHG | WEIGHT: 197.2 LBS | HEART RATE: 75 BPM | TEMPERATURE: 98 F | BODY MASS INDEX: 30.95 KG/M2 | DIASTOLIC BLOOD PRESSURE: 103 MMHG | OXYGEN SATURATION: 97 % | HEIGHT: 67 IN | RESPIRATION RATE: 16 BRPM

## 2018-11-08 DIAGNOSIS — R26.9 GAIT DISTURBANCE: Primary | ICD-10-CM

## 2018-11-08 DIAGNOSIS — M54.12 CERVICAL RADICULOPATHY: ICD-10-CM

## 2018-11-08 ASSESSMENT — PAIN SCALES - GENERAL: PAINLEVEL: NO PAIN (0)

## 2018-11-08 NOTE — LETTER
Date:December 28, 2018      Patient was self referred, no letter generated. Do not send.        [facility] Health Information

## 2018-11-08 NOTE — MR AVS SNAPSHOT
"              After Visit Summary   11/8/2018    Cosme Brown    MRN: [id number]           Patient Information     Date Of Birth          1950        Visit Information        Provider Department      11/8/2018 9:30 AM Carlos Asif MD [facility] Neurology        Today's Diagnoses     Gait disturbance    -  1    Cervical radiculopathy           Follow-ups after your visit        Additional Services     Other Specialty Referral       Timbo EDMONDS eval and treat including balance issues                  Who to contact     Please call your clinic at [phone] to:    Ask questions about your health    Make or cancel appointments    Discuss your medicines    Learn about your test results    Speak to your doctor            Additional Information About Your Visit        Care EveryWhere ID     This is your Care EveryWhere ID. This could be used by other organizations to access your [place] medical records  [id number]        Your Vitals Were     Pulse Temperature Respirations Height Pulse Oximetry BMI (Body Mass Index)    75 98  F (36.7  C) (Oral) 16 1.702 m (5' 7\") 97% 30.89 kg/m2       Blood Pressure from Last 3 Encounters:   11/08/18 (!) 148/103   09/26/18 (!) 140/93   08/15/18 (!) 147/95    Weight from Last 3 Encounters:   11/08/18 89.4 kg (197 lb 3.2 oz)   09/26/18 89.2 kg (196 lb 11.2 oz)   08/15/18 87.5 kg (193 lb)              We Performed the Following     Other Specialty Referral        Primary Care Provider    None Specified       No primary provider on file.        Equal Access to Services     [facility] : Hadii jin Olivares, waaxda luqadaha, qaybta kaalmada richard, leisa morley . So [facility] [phone].    ATENCIÓN: Si habla español, tiene a thomson disposición servicios gratuitos de asistencia lingüística. Llame al [phone].    We comply with applicable federal civil rights laws and [place] laws. We do not discriminate on the basis of " race, color, national origin, age, disability, sex, sexual orientation, or gender identity.            Thank you!     Thank you for choosing [facility] NEUROLOGY  for your care. Our goal is always to provide you with excellent care. Hearing back from our patients is one way we can continue to improve our services. Please take a few minutes to complete the written survey that you may receive in the mail after your visit with us. Thank you!             Your Updated Medication List - Protect others around you: Learn how to safely use, store and throw away your medicines at www.disposemymeds.org.          This list is accurate as of 11/8/18 10:41 AM.  Always use your most recent med list.                   Brand Name Dispense Instructions for use Diagnosis    aspirin 81 MG EC tablet      Take 81 mg by mouth daily        esomeprazole 40 MG CR capsule    nexIUM     daily        irbesartan 150 MG tablet    AVAPRO     daily        vitamin D2 41693 UNIT capsule    ERGOCALCIFEROL    8 capsule    Take 1 capsule (50,000 Units) by mouth every 7 days for 8 doses    Vitamin D deficiency

## 2018-11-08 NOTE — NURSING NOTE
Chief Complaint   Patient presents with     RECHECK     [facility] RETURN- EMG RESULTS F/U     Christopher Sigala, EMT

## 2018-11-08 NOTE — LETTER
2018       RE: Cosme Brown   [place] [address]     Dear Colleague,    Thank you for referring your patient, Cosme Brown, to the [facility] NEUROLOGY at [facility]. Please see a copy of my visit note below.    Service Date: 2018      Herberth Travis MD   [facility]    [address] [place] [address]      RE: Cosme Brown   MRN: [id number]   : 1950      Dear Herberth:      This is in regard to followup on Cosme Brown.  The patient returns today with a chief complaint of imbalance.      The patient underwent further testing at my suggestion.  His initial EMG suggested a lumbar radicular issue and he did have some changes in the lumbar MRI scan that I have reviewed with him earlier.  These findings were from a supine examination, as you are aware.  At the L4-L5 level, he had mild bilateral foraminal narrowing and he had posterior disk bulge involving mild narrowing of the left lateral recess.  With this study being normal or negative, he did undergo further testing on 10/04/2018 of the thoracic spine.  This showed some minor degenerative changes but basically was unremarkable.  He also had an MRI scan of the cervical spine that showed multilevel spondylosis.  He had severe right neural foraminal stenosis at C3-C4 and moderate right neural foraminal stenosis at C5-C6.  He had mild spinal canal stenosis at that level.  There was no spinal cord impingement.  He has had some right shoulder scapular pain in the past and I suspect this is causative from that.  I have talked with him about getting involved in physical therapy with GREY Cole.  He can be seen at [facility] and he also could help him with his balance.  The patient is willing to start therapy for some chronic neck and right shoulder pain as well as his balance now.  I have suggested he have followup in the [facility] here at [facility] if he does not improve.  He could see Dr. Vazquez there.  The patient was found on admission to the clinic today by our medical assistant using a blood pressure machine to have a blood pressure initially of 158/102.  It was rechecked and it was 148/103.  His pulse was 75.  I rechecked it with his soft cuff and it was 140/90.  The patient has normal cardiac sounds.  He did not have cervical bruits.  His lungs were clear to auscultation.  He did not have any murmurs or gallops noted.  The patient is aware that he does have hypertension and he did tell me he is in the process of having further treatment through you.  He promised to see you within the next week.  I have suggested he get a home blood pressure cuff and start taking blood pressure daily.  He is continuing on irbesartan and I have suggested he may need to have an addition of other medicine such as a beta blocker under your direction.  He is going to be speaking with you shortly.  He promised to go ahead with this.  His vitamin D level was low and he has been given vitamin D replacement.  I have suggested this also be followed through your clinic.  The patient did take all of this under advisement.  He will be seen in the General Neurology Department here now on a p.r.n. basis.  I made sure that he had copies of all the test results that have been done.  His vitamin D level was 16 on 09/26/2018.  He had normal free T4 and TSH.  His protein electrophoresis did not show any monoclonal abnormalities.  The patient did have a slightly low IgM of 27, which I am not certain of the significance.  His B12 level was 693 picograms and he had a negative or normal paraneoplastic panel.      I spent 20 minutes with the patient today.  Over 50% of the time this involved counseling and coordination of care.      QUYEN SMART MD          D: 11/17/2018   T: 11/19/2018   MT: GUILLERMO      Name:     EMILY NAPIER   MRN:       [id number]        Account:      [id number]   :      1950           Service Date: 2018      Document: [id number]

## 2018-11-19 NOTE — PROGRESS NOTES
Service Date: 2018      Herberth Travsi MD   [facility]    [address] [id number]      RE: Cosme Brown   MRN: [id number]   : 1950      Dear Herberth:      This is in regard to followup on Cosme Brown.  The patient returns today with a chief complaint of imbalance.      The patient underwent further testing at my suggestion.  His initial EMG suggested a lumbar radicular issue and he did have some changes in the lumbar MRI scan that I have reviewed with him earlier.  These findings were from a supine examination, as you are aware.  At the L4-L5 level, he had mild bilateral foraminal narrowing and he had posterior disk bulge involving mild narrowing of the left lateral recess.  With this study being normal or negative, he did undergo further testing on 10/04/2018 of the thoracic spine.  This showed some minor degenerative changes but basically was unremarkable.  He also had an MRI scan of the cervical spine that showed multilevel spondylosis.  He had severe right neural foraminal stenosis at C3-C4 and moderate right neural foraminal stenosis at C5-C6.  He had mild spinal canal stenosis at that level.  There was no spinal cord impingement.  He has had some right shoulder scapular pain in the past and I suspect this is causative from that.  I have talked with him about getting involved in physical therapy with [facility].  He can be seen at [facility] and he also could help him with his balance.  The patient is willing to start therapy for some chronic neck and right shoulder pain as well as his balance now.  I have suggested he have followup in the [facility] here at [facility] if he does not improve.  He could see Dr. Vazquez there.  The patient was found on admission to the clinic today by our medical assistant using a blood pressure machine to have a blood pressure initially of 158/102.  It was rechecked and it was  148/103.  His pulse was 75.  I rechecked it with his soft cuff and it was 140/90.  The patient has normal cardiac sounds.  He did not have cervical bruits.  His lungs were clear to auscultation.  He did not have any murmurs or gallops noted.  The patient is aware that he does have hypertension and he did tell me he is in the process of having further treatment through you.  He promised to see you within the next week.  I have suggested he get a home blood pressure cuff and start taking blood pressure daily.  He is continuing on irbesartan and I have suggested he may need to have an addition of other medicine such as a beta blocker under your direction.  He is going to be speaking with you shortly.  He promised to go ahead with this.  His vitamin D level was low and he has been given vitamin D replacement.  I have suggested this also be followed through your clinic.  The patient did take all of this under advisement.  He will be seen in the General Neurology Department here now on a p.r.n. basis.  I made sure that he had copies of all the test results that have been done.  His vitamin D level was 16 on 2018.  He had normal free T4 and TSH.  His protein electrophoresis did not show any monoclonal abnormalities.  The patient did have a slightly low IgM of 27, which I am not certain of the significance.  His B12 level was 693 picograms and he had a negative or normal paraneoplastic panel.      Thank you for allowing me to see this patient.       Sincerely,       Quyen Asif MD      I spent 20 minutes with the patient today.  Over 50% of the time this involved counseling and coordination of care.         QUYEN ASIF MD             D: 2018   T: 2018   MT: GUILLERMO      Name:     EMILY NAPIER   MRN:      [id number]        Account:      [id number]   :      1950           Service Date: 2018      Document: [id number]

## 2019-04-30 ENCOUNTER — RECORDS - HEALTHEAST (OUTPATIENT)
Dept: LAB | Facility: CLINIC | Age: 69
End: 2019-04-30

## 2019-04-30 LAB
ANION GAP SERPL CALCULATED.3IONS-SCNC: 11 MMOL/L (ref 5–18)
BASOPHILS # BLD AUTO: 0.1 THOU/UL (ref 0–0.2)
BASOPHILS NFR BLD AUTO: 1 % (ref 0–2)
BUN SERPL-MCNC: 20 MG/DL (ref 8–28)
CALCIUM SERPL-MCNC: 9.3 MG/DL (ref 8.5–10.5)
CHLORIDE BLD-SCNC: 104 MMOL/L (ref 98–107)
CO2 SERPL-SCNC: 25 MMOL/L (ref 22–31)
CREAT SERPL-MCNC: 1.01 MG/DL (ref 0.7–1.3)
EOSINOPHIL # BLD AUTO: 0.2 THOU/UL (ref 0–0.4)
EOSINOPHIL NFR BLD AUTO: 3 % (ref 0–6)
ERYTHROCYTE [DISTWIDTH] IN BLOOD BY AUTOMATED COUNT: 14.1 % (ref 11–14.5)
GFR SERPL CREATININE-BSD FRML MDRD: >60 ML/MIN/1.73M2
GLUCOSE BLD-MCNC: 107 MG/DL (ref 70–125)
HCT VFR BLD AUTO: 43.2 % (ref 40–54)
HGB BLD-MCNC: 15.1 G/DL (ref 14–18)
LYMPHOCYTES # BLD AUTO: 1.5 THOU/UL (ref 0.8–4.4)
LYMPHOCYTES NFR BLD AUTO: 17 % (ref 20–40)
MAGNESIUM SERPL-MCNC: 2.1 MG/DL (ref 1.8–2.6)
MCH RBC QN AUTO: 32.4 PG (ref 27–34)
MCHC RBC AUTO-ENTMCNC: 35 G/DL (ref 32–36)
MCV RBC AUTO: 93 FL (ref 80–100)
MONOCYTES # BLD AUTO: 0.5 THOU/UL (ref 0–0.9)
MONOCYTES NFR BLD AUTO: 6 % (ref 2–10)
NEUTROPHILS # BLD AUTO: 6.4 THOU/UL (ref 2–7.7)
NEUTROPHILS NFR BLD AUTO: 74 % (ref 50–70)
PLATELET # BLD AUTO: 385 THOU/UL (ref 140–440)
PMV BLD AUTO: 10.9 FL (ref 8.5–12.5)
POTASSIUM BLD-SCNC: 3.7 MMOL/L (ref 3.5–5)
RBC # BLD AUTO: 4.66 MILL/UL (ref 4.4–6.2)
SODIUM SERPL-SCNC: 140 MMOL/L (ref 136–145)
WBC: 8.7 THOU/UL (ref 4–11)

## 2019-05-01 LAB — 25(OH)D3 SERPL-MCNC: 42.1 NG/ML (ref 30–80)

## 2019-05-06 ENCOUNTER — RECORDS - HEALTHEAST (OUTPATIENT)
Dept: LAB | Facility: CLINIC | Age: 69
End: 2019-05-06

## 2019-05-06 LAB
CHOLEST SERPL-MCNC: 165 MG/DL
FASTING STATUS PATIENT QL REPORTED: NO
HDLC SERPL-MCNC: 31 MG/DL
LDLC SERPL CALC-MCNC: 102 MG/DL
TRIGL SERPL-MCNC: 161 MG/DL

## 2019-11-12 ENCOUNTER — RECORDS - HEALTHEAST (OUTPATIENT)
Dept: LAB | Facility: CLINIC | Age: 69
End: 2019-11-12

## 2019-11-12 LAB
ALBUMIN SERPL-MCNC: 4.3 G/DL (ref 3.5–5)
ALP SERPL-CCNC: 63 U/L (ref 45–120)
ALT SERPL W P-5'-P-CCNC: 38 U/L (ref 0–45)
ANION GAP SERPL CALCULATED.3IONS-SCNC: 11 MMOL/L (ref 5–18)
AST SERPL W P-5'-P-CCNC: 25 U/L (ref 0–40)
BILIRUB SERPL-MCNC: 0.7 MG/DL (ref 0–1)
BUN SERPL-MCNC: 21 MG/DL (ref 8–28)
CALCIUM SERPL-MCNC: 9.4 MG/DL (ref 8.5–10.5)
CHLORIDE BLD-SCNC: 104 MMOL/L (ref 98–107)
CHOLEST SERPL-MCNC: 187 MG/DL
CO2 SERPL-SCNC: 25 MMOL/L (ref 22–31)
CREAT SERPL-MCNC: 1.07 MG/DL (ref 0.7–1.3)
FASTING STATUS PATIENT QL REPORTED: NO
GFR SERPL CREATININE-BSD FRML MDRD: >60 ML/MIN/1.73M2
GLUCOSE BLD-MCNC: 83 MG/DL (ref 70–125)
HDLC SERPL-MCNC: 34 MG/DL
LDLC SERPL CALC-MCNC: 104 MG/DL
POTASSIUM BLD-SCNC: 4.5 MMOL/L (ref 3.5–5)
PROT SERPL-MCNC: 6.5 G/DL (ref 6–8)
SODIUM SERPL-SCNC: 140 MMOL/L (ref 136–145)
TRIGL SERPL-MCNC: 246 MG/DL

## 2019-11-13 LAB — HCV AB SERPL QL IA: NEGATIVE

## 2020-11-11 ENCOUNTER — RECORDS - HEALTHEAST (OUTPATIENT)
Dept: LAB | Facility: CLINIC | Age: 70
End: 2020-11-11

## 2020-11-12 LAB
ALBUMIN SERPL-MCNC: 4.3 G/DL (ref 3.5–5)
ALP SERPL-CCNC: 60 U/L (ref 45–120)
ALT SERPL W P-5'-P-CCNC: 35 U/L (ref 0–45)
ANION GAP SERPL CALCULATED.3IONS-SCNC: 10 MMOL/L (ref 5–18)
AST SERPL W P-5'-P-CCNC: 27 U/L (ref 0–40)
BILIRUB SERPL-MCNC: 0.5 MG/DL (ref 0–1)
BUN SERPL-MCNC: 19 MG/DL (ref 8–28)
CALCIUM SERPL-MCNC: 9.3 MG/DL (ref 8.5–10.5)
CHLORIDE BLD-SCNC: 102 MMOL/L (ref 98–107)
CHOLEST SERPL-MCNC: 180 MG/DL
CO2 SERPL-SCNC: 27 MMOL/L (ref 22–31)
CREAT SERPL-MCNC: 1.3 MG/DL (ref 0.7–1.3)
FASTING STATUS PATIENT QL REPORTED: NO
GFR SERPL CREATININE-BSD FRML MDRD: 54 ML/MIN/1.73M2
GLUCOSE BLD-MCNC: 98 MG/DL (ref 70–125)
HDLC SERPL-MCNC: 29 MG/DL
LDLC SERPL CALC-MCNC: 104 MG/DL
LDLC SERPL CALC-MCNC: ABNORMAL MG/DL
POTASSIUM BLD-SCNC: 4.4 MMOL/L (ref 3.5–5)
PROT SERPL-MCNC: 6.6 G/DL (ref 6–8)
PSA SERPL-MCNC: <0.1 NG/ML (ref 0–6.5)
SODIUM SERPL-SCNC: 139 MMOL/L (ref 136–145)
TRIGL SERPL-MCNC: 559 MG/DL

## 2020-11-13 LAB — 25(OH)D3 SERPL-MCNC: 23.3 NG/ML (ref 30–80)

## 2020-11-19 ENCOUNTER — RECORDS - HEALTHEAST (OUTPATIENT)
Dept: LAB | Facility: CLINIC | Age: 70
End: 2020-11-19

## 2020-11-19 LAB
C REACTIVE PROTEIN LHE: <0.1 MG/DL (ref 0–0.8)
CHOLEST SERPL-MCNC: 191 MG/DL
ERYTHROCYTE [SEDIMENTATION RATE] IN BLOOD BY WESTERGREN METHOD: 2 MM/HR (ref 0–15)
FASTING STATUS PATIENT QL REPORTED: ABNORMAL
HDLC SERPL-MCNC: 36 MG/DL
LDLC SERPL CALC-MCNC: 107 MG/DL
MAGNESIUM SERPL-MCNC: 2.3 MG/DL (ref 1.8–2.6)
TRIGL SERPL-MCNC: 241 MG/DL
VIT B12 SERPL-MCNC: 1195 PG/ML (ref 213–816)

## 2021-04-16 ENCOUNTER — RECORDS - HEALTHEAST (OUTPATIENT)
Dept: LAB | Facility: CLINIC | Age: 71
End: 2021-04-16

## 2021-04-16 LAB
ALBUMIN SERPL-MCNC: 4.2 G/DL (ref 3.5–5)
ALP SERPL-CCNC: 68 U/L (ref 45–120)
ALT SERPL W P-5'-P-CCNC: 38 U/L (ref 0–45)
ANION GAP SERPL CALCULATED.3IONS-SCNC: 9 MMOL/L (ref 5–18)
AST SERPL W P-5'-P-CCNC: 26 U/L (ref 0–40)
BILIRUB SERPL-MCNC: 0.4 MG/DL (ref 0–1)
BUN SERPL-MCNC: 19 MG/DL (ref 8–28)
CALCIUM SERPL-MCNC: 8.9 MG/DL (ref 8.5–10.5)
CHLORIDE BLD-SCNC: 105 MMOL/L (ref 98–107)
CHOLEST SERPL-MCNC: 145 MG/DL
CO2 SERPL-SCNC: 28 MMOL/L (ref 22–31)
CREAT SERPL-MCNC: 1.05 MG/DL (ref 0.7–1.3)
FASTING STATUS PATIENT QL REPORTED: YES
GFR SERPL CREATININE-BSD FRML MDRD: >60 ML/MIN/1.73M2
GLUCOSE BLD-MCNC: 106 MG/DL (ref 70–125)
HDLC SERPL-MCNC: 31 MG/DL
LDLC SERPL CALC-MCNC: 80 MG/DL
POTASSIUM BLD-SCNC: 4 MMOL/L (ref 3.5–5)
PROT SERPL-MCNC: 6.4 G/DL (ref 6–8)
PSA SERPL-MCNC: <0.1 NG/ML (ref 0–6.5)
SODIUM SERPL-SCNC: 142 MMOL/L (ref 136–145)
TRIGL SERPL-MCNC: 171 MG/DL

## 2021-04-19 LAB — 25(OH)D3 SERPL-MCNC: 39.8 NG/ML (ref 30–80)

## 2021-05-25 ENCOUNTER — RECORDS - HEALTHEAST (OUTPATIENT)
Dept: ADMINISTRATIVE | Facility: CLINIC | Age: 71
End: 2021-05-25

## 2021-05-26 ENCOUNTER — RECORDS - HEALTHEAST (OUTPATIENT)
Dept: ADMINISTRATIVE | Facility: CLINIC | Age: 71
End: 2021-05-26

## 2021-05-31 ENCOUNTER — RECORDS - HEALTHEAST (OUTPATIENT)
Dept: ADMINISTRATIVE | Facility: CLINIC | Age: 71
End: 2021-05-31

## 2021-06-01 ENCOUNTER — RECORDS - HEALTHEAST (OUTPATIENT)
Dept: ADMINISTRATIVE | Facility: CLINIC | Age: 71
End: 2021-06-01

## 2021-11-02 ENCOUNTER — LAB REQUISITION (OUTPATIENT)
Dept: LAB | Facility: CLINIC | Age: 71
End: 2021-11-02
Payer: COMMERCIAL

## 2021-11-02 DIAGNOSIS — Z03.818 ENCOUNTER FOR OBSERVATION FOR SUSPECTED EXPOSURE TO OTHER BIOLOGICAL AGENTS RULED OUT: ICD-10-CM

## 2021-11-02 PROCEDURE — U0003 INFECTIOUS AGENT DETECTION BY NUCLEIC ACID (DNA OR RNA); SEVERE ACUTE RESPIRATORY SYNDROME CORONAVIRUS 2 (SARS-COV-2) (CORONAVIRUS DISEASE [COVID-19]), AMPLIFIED PROBE TECHNIQUE, MAKING USE OF HIGH THROUGHPUT TECHNOLOGIES AS DESCRIBED BY CMS-2020-01-R: HCPCS | Mod: ORL | Performed by: FAMILY MEDICINE

## 2021-11-05 LAB — SARS-COV-2 RNA RESP QL NAA+PROBE: NEGATIVE

## 2022-04-15 ENCOUNTER — LAB REQUISITION (OUTPATIENT)
Dept: LAB | Facility: CLINIC | Age: 72
End: 2022-04-15
Payer: COMMERCIAL

## 2022-04-15 DIAGNOSIS — E55.9 VITAMIN D DEFICIENCY, UNSPECIFIED: ICD-10-CM

## 2022-04-15 DIAGNOSIS — I10 ESSENTIAL (PRIMARY) HYPERTENSION: ICD-10-CM

## 2022-04-15 DIAGNOSIS — E78.5 HYPERLIPIDEMIA, UNSPECIFIED: ICD-10-CM

## 2022-04-15 LAB
ALBUMIN SERPL-MCNC: 4.4 G/DL (ref 3.5–5)
ALP SERPL-CCNC: 77 U/L (ref 45–120)
ALT SERPL W P-5'-P-CCNC: 29 U/L (ref 0–45)
ANION GAP SERPL CALCULATED.3IONS-SCNC: 12 MMOL/L (ref 5–18)
AST SERPL W P-5'-P-CCNC: 24 U/L (ref 0–40)
BASOPHILS # BLD AUTO: 0.1 10E3/UL (ref 0–0.2)
BASOPHILS NFR BLD AUTO: 1 %
BILIRUB SERPL-MCNC: 0.9 MG/DL (ref 0–1)
BUN SERPL-MCNC: 16 MG/DL (ref 8–28)
CALCIUM SERPL-MCNC: 9.9 MG/DL (ref 8.5–10.5)
CHLORIDE BLD-SCNC: 101 MMOL/L (ref 98–107)
CHOLEST SERPL-MCNC: 165 MG/DL
CO2 SERPL-SCNC: 28 MMOL/L (ref 22–31)
CREAT SERPL-MCNC: 1.13 MG/DL (ref 0.7–1.3)
EOSINOPHIL # BLD AUTO: 0.3 10E3/UL (ref 0–0.7)
EOSINOPHIL NFR BLD AUTO: 3 %
ERYTHROCYTE [DISTWIDTH] IN BLOOD BY AUTOMATED COUNT: 17.9 % (ref 10–15)
GFR SERPL CREATININE-BSD FRML MDRD: 68 ML/MIN/1.73M2
GLUCOSE BLD-MCNC: 97 MG/DL (ref 70–125)
HCT VFR BLD AUTO: 53.9 % (ref 40–53)
HDLC SERPL-MCNC: 36 MG/DL
HGB BLD-MCNC: 17.9 G/DL (ref 13.3–17.7)
IMM GRANULOCYTES # BLD: 0 10E3/UL
IMM GRANULOCYTES NFR BLD: 0 %
LDLC SERPL CALC-MCNC: 100 MG/DL
LYMPHOCYTES # BLD AUTO: 1.2 10E3/UL (ref 0.8–5.3)
LYMPHOCYTES NFR BLD AUTO: 9 %
MCH RBC QN AUTO: 28.6 PG (ref 26.5–33)
MCHC RBC AUTO-ENTMCNC: 33.2 G/DL (ref 31.5–36.5)
MCV RBC AUTO: 86 FL (ref 78–100)
MONOCYTES # BLD AUTO: 0.5 10E3/UL (ref 0–1.3)
MONOCYTES NFR BLD AUTO: 4 %
NEUTROPHILS # BLD AUTO: 10.8 10E3/UL (ref 1.6–8.3)
NEUTROPHILS NFR BLD AUTO: 83 %
NRBC # BLD AUTO: 0 10E3/UL
NRBC BLD AUTO-RTO: 0 /100
PLATELET # BLD AUTO: 764 10E3/UL (ref 150–450)
POTASSIUM BLD-SCNC: 4.7 MMOL/L (ref 3.5–5)
PROT SERPL-MCNC: 7 G/DL (ref 6–8)
RBC # BLD AUTO: 6.26 10E6/UL (ref 4.4–5.9)
SODIUM SERPL-SCNC: 141 MMOL/L (ref 136–145)
TRIGL SERPL-MCNC: 145 MG/DL
WBC # BLD AUTO: 12.6 10E3/UL (ref 4–11)

## 2022-04-15 PROCEDURE — 80061 LIPID PANEL: CPT | Mod: ORL | Performed by: STUDENT IN AN ORGANIZED HEALTH CARE EDUCATION/TRAINING PROGRAM

## 2022-04-15 PROCEDURE — 85025 COMPLETE CBC W/AUTO DIFF WBC: CPT | Mod: ORL | Performed by: STUDENT IN AN ORGANIZED HEALTH CARE EDUCATION/TRAINING PROGRAM

## 2022-04-15 PROCEDURE — 82306 VITAMIN D 25 HYDROXY: CPT | Mod: ORL | Performed by: STUDENT IN AN ORGANIZED HEALTH CARE EDUCATION/TRAINING PROGRAM

## 2022-04-15 PROCEDURE — 80053 COMPREHEN METABOLIC PANEL: CPT | Mod: ORL | Performed by: STUDENT IN AN ORGANIZED HEALTH CARE EDUCATION/TRAINING PROGRAM

## 2022-04-16 LAB — DEPRECATED CALCIDIOL+CALCIFEROL SERPL-MC: 46 UG/L (ref 20–75)

## 2022-05-23 ENCOUNTER — LAB REQUISITION (OUTPATIENT)
Dept: LAB | Facility: CLINIC | Age: 72
End: 2022-05-23
Payer: COMMERCIAL

## 2022-05-23 DIAGNOSIS — I10 ESSENTIAL (PRIMARY) HYPERTENSION: ICD-10-CM

## 2022-05-23 LAB
BASOPHILS # BLD AUTO: 0.1 10E3/UL (ref 0–0.2)
BASOPHILS NFR BLD AUTO: 1 %
EOSINOPHIL # BLD AUTO: 0.3 10E3/UL (ref 0–0.7)
EOSINOPHIL NFR BLD AUTO: 3 %
ERYTHROCYTE [DISTWIDTH] IN BLOOD BY AUTOMATED COUNT: 18.9 % (ref 10–15)
HCT VFR BLD AUTO: 54.1 % (ref 40–53)
HGB BLD-MCNC: 17.1 G/DL (ref 13.3–17.7)
IMM GRANULOCYTES # BLD: 0 10E3/UL
IMM GRANULOCYTES NFR BLD: 0 %
LYMPHOCYTES # BLD AUTO: 1.3 10E3/UL (ref 0.8–5.3)
LYMPHOCYTES NFR BLD AUTO: 13 %
MCH RBC QN AUTO: 27.9 PG (ref 26.5–33)
MCHC RBC AUTO-ENTMCNC: 31.6 G/DL (ref 31.5–36.5)
MCV RBC AUTO: 88 FL (ref 78–100)
MONOCYTES # BLD AUTO: 0.6 10E3/UL (ref 0–1.3)
MONOCYTES NFR BLD AUTO: 6 %
NEUTROPHILS # BLD AUTO: 7.8 10E3/UL (ref 1.6–8.3)
NEUTROPHILS NFR BLD AUTO: 77 %
NRBC # BLD AUTO: 0 10E3/UL
NRBC BLD AUTO-RTO: 0 /100
PLATELET # BLD AUTO: 724 10E3/UL (ref 150–450)
RBC # BLD AUTO: 6.14 10E6/UL (ref 4.4–5.9)
WBC # BLD AUTO: 10.3 10E3/UL (ref 4–11)

## 2022-05-23 PROCEDURE — 85025 COMPLETE CBC W/AUTO DIFF WBC: CPT | Mod: ORL | Performed by: STUDENT IN AN ORGANIZED HEALTH CARE EDUCATION/TRAINING PROGRAM

## 2022-06-09 ENCOUNTER — HOSPITAL ENCOUNTER (OUTPATIENT)
Dept: ULTRASOUND IMAGING | Facility: HOSPITAL | Age: 72
Discharge: HOME | End: 2022-06-09
Attending: INTERNAL MEDICINE | Admitting: INTERNAL MEDICINE
Payer: COMMERCIAL

## 2022-06-09 DIAGNOSIS — D47.3 ESSENTIAL THROMBOCYTHEMIA (H): ICD-10-CM

## 2022-06-09 PROCEDURE — 76705 ECHO EXAM OF ABDOMEN: CPT

## 2022-12-07 ENCOUNTER — LAB REQUISITION (OUTPATIENT)
Dept: LAB | Facility: CLINIC | Age: 72
End: 2022-12-07
Payer: COMMERCIAL

## 2022-12-07 DIAGNOSIS — E55.9 VITAMIN D DEFICIENCY, UNSPECIFIED: ICD-10-CM

## 2022-12-07 DIAGNOSIS — E78.5 HYPERLIPIDEMIA, UNSPECIFIED: ICD-10-CM

## 2022-12-07 LAB
BASOPHILS # BLD AUTO: 0 10E3/UL (ref 0–0.2)
BASOPHILS NFR BLD AUTO: 1 %
CHOLEST SERPL-MCNC: 158 MG/DL
EOSINOPHIL # BLD AUTO: 0.2 10E3/UL (ref 0–0.7)
EOSINOPHIL NFR BLD AUTO: 3 %
ERYTHROCYTE [DISTWIDTH] IN BLOOD BY AUTOMATED COUNT: 13.2 % (ref 10–15)
HCT VFR BLD AUTO: 42.1 % (ref 40–53)
HDLC SERPL-MCNC: 36 MG/DL
HGB BLD-MCNC: 14.8 G/DL (ref 13.3–17.7)
IMM GRANULOCYTES # BLD: 0 10E3/UL
IMM GRANULOCYTES NFR BLD: 0 %
LDLC SERPL CALC-MCNC: 83 MG/DL
LYMPHOCYTES # BLD AUTO: 1.3 10E3/UL (ref 0.8–5.3)
LYMPHOCYTES NFR BLD AUTO: 19 %
MCH RBC QN AUTO: 39.6 PG (ref 26.5–33)
MCHC RBC AUTO-ENTMCNC: 35.2 G/DL (ref 31.5–36.5)
MCV RBC AUTO: 113 FL (ref 78–100)
MONOCYTES # BLD AUTO: 0.3 10E3/UL (ref 0–1.3)
MONOCYTES NFR BLD AUTO: 5 %
NEUTROPHILS # BLD AUTO: 4.8 10E3/UL (ref 1.6–8.3)
NEUTROPHILS NFR BLD AUTO: 72 %
NONHDLC SERPL-MCNC: 122 MG/DL
NRBC # BLD AUTO: 0 10E3/UL
NRBC BLD AUTO-RTO: 0 /100
PLATELET # BLD AUTO: 231 10E3/UL (ref 150–450)
RBC # BLD AUTO: 3.74 10E6/UL (ref 4.4–5.9)
TRIGL SERPL-MCNC: 196 MG/DL
WBC # BLD AUTO: 6.6 10E3/UL (ref 4–11)

## 2022-12-07 PROCEDURE — 80053 COMPREHEN METABOLIC PANEL: CPT | Mod: ORL | Performed by: STUDENT IN AN ORGANIZED HEALTH CARE EDUCATION/TRAINING PROGRAM

## 2022-12-07 PROCEDURE — 82306 VITAMIN D 25 HYDROXY: CPT | Mod: ORL | Performed by: STUDENT IN AN ORGANIZED HEALTH CARE EDUCATION/TRAINING PROGRAM

## 2022-12-07 PROCEDURE — 80061 LIPID PANEL: CPT | Mod: ORL | Performed by: STUDENT IN AN ORGANIZED HEALTH CARE EDUCATION/TRAINING PROGRAM

## 2022-12-07 PROCEDURE — 85025 COMPLETE CBC W/AUTO DIFF WBC: CPT | Mod: ORL | Performed by: STUDENT IN AN ORGANIZED HEALTH CARE EDUCATION/TRAINING PROGRAM

## 2022-12-08 LAB
ALBUMIN SERPL BCG-MCNC: 4.6 G/DL (ref 3.5–5.2)
ALP SERPL-CCNC: 62 U/L (ref 40–129)
ALT SERPL W P-5'-P-CCNC: 25 U/L (ref 10–50)
ANION GAP SERPL CALCULATED.3IONS-SCNC: 12 MMOL/L (ref 7–15)
AST SERPL W P-5'-P-CCNC: 23 U/L (ref 10–50)
BILIRUB SERPL-MCNC: 0.4 MG/DL
BUN SERPL-MCNC: 19.1 MG/DL (ref 8–23)
CALCIUM SERPL-MCNC: 9.3 MG/DL (ref 8.8–10.2)
CHLORIDE SERPL-SCNC: 104 MMOL/L (ref 98–107)
CREAT SERPL-MCNC: 1.11 MG/DL (ref 0.67–1.17)
DEPRECATED CALCIDIOL+CALCIFEROL SERPL-MC: 53 UG/L (ref 20–75)
DEPRECATED HCO3 PLAS-SCNC: 26 MMOL/L (ref 22–29)
GFR SERPL CREATININE-BSD FRML MDRD: 70 ML/MIN/1.73M2
GLUCOSE SERPL-MCNC: 86 MG/DL (ref 70–99)
POTASSIUM SERPL-SCNC: 4.1 MMOL/L (ref 3.4–5.3)
PROT SERPL-MCNC: 6.4 G/DL (ref 6.4–8.3)
SODIUM SERPL-SCNC: 142 MMOL/L (ref 136–145)

## 2023-06-13 ENCOUNTER — LAB REQUISITION (OUTPATIENT)
Dept: LAB | Facility: CLINIC | Age: 73
End: 2023-06-13
Payer: COMMERCIAL

## 2023-06-13 DIAGNOSIS — E78.5 HYPERLIPIDEMIA, UNSPECIFIED: ICD-10-CM

## 2023-06-13 PROCEDURE — 80061 LIPID PANEL: CPT | Mod: ORL | Performed by: STUDENT IN AN ORGANIZED HEALTH CARE EDUCATION/TRAINING PROGRAM

## 2023-06-13 PROCEDURE — 80053 COMPREHEN METABOLIC PANEL: CPT | Mod: ORL | Performed by: STUDENT IN AN ORGANIZED HEALTH CARE EDUCATION/TRAINING PROGRAM

## 2023-06-14 LAB
ALBUMIN SERPL BCG-MCNC: 4.5 G/DL (ref 3.5–5.2)
ALP SERPL-CCNC: 60 U/L (ref 40–129)
ALT SERPL W P-5'-P-CCNC: 30 U/L (ref 0–70)
ANION GAP SERPL CALCULATED.3IONS-SCNC: 11 MMOL/L (ref 7–15)
AST SERPL W P-5'-P-CCNC: 29 U/L (ref 0–45)
BILIRUB SERPL-MCNC: 0.6 MG/DL
BUN SERPL-MCNC: 18.6 MG/DL (ref 8–23)
CALCIUM SERPL-MCNC: 9.5 MG/DL (ref 8.8–10.2)
CHLORIDE SERPL-SCNC: 103 MMOL/L (ref 98–107)
CHOLEST SERPL-MCNC: 158 MG/DL
CREAT SERPL-MCNC: 1.07 MG/DL (ref 0.67–1.17)
DEPRECATED HCO3 PLAS-SCNC: 26 MMOL/L (ref 22–29)
GFR SERPL CREATININE-BSD FRML MDRD: 72 ML/MIN/1.73M2
GLUCOSE SERPL-MCNC: 119 MG/DL (ref 70–99)
HDLC SERPL-MCNC: 34 MG/DL
LDLC SERPL CALC-MCNC: 82 MG/DL
NONHDLC SERPL-MCNC: 124 MG/DL
POTASSIUM SERPL-SCNC: 3.6 MMOL/L (ref 3.4–5.3)
PROT SERPL-MCNC: 6.2 G/DL (ref 6.4–8.3)
SODIUM SERPL-SCNC: 140 MMOL/L (ref 136–145)
TRIGL SERPL-MCNC: 209 MG/DL

## 2023-12-04 ENCOUNTER — LAB REQUISITION (OUTPATIENT)
Dept: LAB | Facility: CLINIC | Age: 73
End: 2023-12-04
Payer: COMMERCIAL

## 2023-12-04 DIAGNOSIS — Z85.46 PERSONAL HISTORY OF MALIGNANT NEOPLASM OF PROSTATE: ICD-10-CM

## 2023-12-04 DIAGNOSIS — E78.5 HYPERLIPIDEMIA, UNSPECIFIED: ICD-10-CM

## 2023-12-04 PROCEDURE — 80053 COMPREHEN METABOLIC PANEL: CPT | Mod: ORL | Performed by: STUDENT IN AN ORGANIZED HEALTH CARE EDUCATION/TRAINING PROGRAM

## 2023-12-04 PROCEDURE — 80061 LIPID PANEL: CPT | Mod: ORL | Performed by: STUDENT IN AN ORGANIZED HEALTH CARE EDUCATION/TRAINING PROGRAM

## 2023-12-04 PROCEDURE — 85025 COMPLETE CBC W/AUTO DIFF WBC: CPT | Mod: ORL | Performed by: STUDENT IN AN ORGANIZED HEALTH CARE EDUCATION/TRAINING PROGRAM

## 2023-12-04 PROCEDURE — 84153 ASSAY OF PSA TOTAL: CPT | Mod: ORL | Performed by: STUDENT IN AN ORGANIZED HEALTH CARE EDUCATION/TRAINING PROGRAM

## 2023-12-05 LAB
ALBUMIN SERPL BCG-MCNC: 4.6 G/DL (ref 3.5–5.2)
ALP SERPL-CCNC: 62 U/L (ref 40–150)
ALT SERPL W P-5'-P-CCNC: 26 U/L (ref 0–70)
ANION GAP SERPL CALCULATED.3IONS-SCNC: 12 MMOL/L (ref 7–15)
AST SERPL W P-5'-P-CCNC: 25 U/L (ref 0–45)
BASOPHILS # BLD AUTO: 0 10E3/UL (ref 0–0.2)
BASOPHILS NFR BLD AUTO: 1 %
BILIRUB SERPL-MCNC: 0.4 MG/DL
BUN SERPL-MCNC: 13.9 MG/DL (ref 8–23)
CALCIUM SERPL-MCNC: 9.4 MG/DL (ref 8.8–10.2)
CHLORIDE SERPL-SCNC: 101 MMOL/L (ref 98–107)
CHOLEST SERPL-MCNC: 155 MG/DL
CREAT SERPL-MCNC: 1.13 MG/DL (ref 0.67–1.17)
DEPRECATED HCO3 PLAS-SCNC: 27 MMOL/L (ref 22–29)
EGFRCR SERPLBLD CKD-EPI 2021: 68 ML/MIN/1.73M2
EOSINOPHIL # BLD AUTO: 0.1 10E3/UL (ref 0–0.7)
EOSINOPHIL NFR BLD AUTO: 2 %
ERYTHROCYTE [DISTWIDTH] IN BLOOD BY AUTOMATED COUNT: 14 % (ref 10–15)
GLUCOSE SERPL-MCNC: 118 MG/DL (ref 70–99)
HCT VFR BLD AUTO: 43.1 % (ref 40–53)
HDLC SERPL-MCNC: 34 MG/DL
HGB BLD-MCNC: 15 G/DL (ref 13.3–17.7)
IMM GRANULOCYTES # BLD: 0 10E3/UL
IMM GRANULOCYTES NFR BLD: 0 %
LDLC SERPL CALC-MCNC: 82 MG/DL
LYMPHOCYTES # BLD AUTO: 1.3 10E3/UL (ref 0.8–5.3)
LYMPHOCYTES NFR BLD AUTO: 18 %
MCH RBC QN AUTO: 37.9 PG (ref 26.5–33)
MCHC RBC AUTO-ENTMCNC: 34.8 G/DL (ref 31.5–36.5)
MCV RBC AUTO: 109 FL (ref 78–100)
MONOCYTES # BLD AUTO: 0.3 10E3/UL (ref 0–1.3)
MONOCYTES NFR BLD AUTO: 5 %
NEUTROPHILS # BLD AUTO: 5.2 10E3/UL (ref 1.6–8.3)
NEUTROPHILS NFR BLD AUTO: 74 %
NONHDLC SERPL-MCNC: 121 MG/DL
NRBC # BLD AUTO: 0 10E3/UL
NRBC BLD AUTO-RTO: 0 /100
PLATELET # BLD AUTO: 285 10E3/UL (ref 150–450)
POTASSIUM SERPL-SCNC: 3.7 MMOL/L (ref 3.4–5.3)
PROT SERPL-MCNC: 6.8 G/DL (ref 6.4–8.3)
PSA SERPL DL<=0.01 NG/ML-MCNC: <0.01 NG/ML (ref 0–6.5)
RBC # BLD AUTO: 3.96 10E6/UL (ref 4.4–5.9)
SODIUM SERPL-SCNC: 140 MMOL/L (ref 135–145)
TRIGL SERPL-MCNC: 197 MG/DL
WBC # BLD AUTO: 7 10E3/UL (ref 4–11)

## 2024-10-18 ENCOUNTER — LAB REQUISITION (OUTPATIENT)
Dept: LAB | Facility: CLINIC | Age: 74
End: 2024-10-18
Payer: COMMERCIAL

## 2024-10-18 DIAGNOSIS — E78.5 HYPERLIPIDEMIA, UNSPECIFIED: ICD-10-CM

## 2024-10-18 DIAGNOSIS — Z85.46 PERSONAL HISTORY OF MALIGNANT NEOPLASM OF PROSTATE: ICD-10-CM

## 2024-10-18 LAB
ALBUMIN SERPL BCG-MCNC: 4.6 G/DL (ref 3.5–5.2)
ALP SERPL-CCNC: 62 U/L (ref 40–150)
ALT SERPL W P-5'-P-CCNC: 30 U/L (ref 0–70)
ANION GAP SERPL CALCULATED.3IONS-SCNC: 11 MMOL/L (ref 7–15)
AST SERPL W P-5'-P-CCNC: 25 U/L (ref 0–45)
BASOPHILS # BLD AUTO: 0.1 10E3/UL (ref 0–0.2)
BASOPHILS NFR BLD AUTO: 1 %
BILIRUB SERPL-MCNC: 0.7 MG/DL
BUN SERPL-MCNC: 22 MG/DL (ref 8–23)
CALCIUM SERPL-MCNC: 9.6 MG/DL (ref 8.8–10.4)
CHLORIDE SERPL-SCNC: 102 MMOL/L (ref 98–107)
CREAT SERPL-MCNC: 1.21 MG/DL (ref 0.67–1.17)
EGFRCR SERPLBLD CKD-EPI 2021: 62 ML/MIN/1.73M2
EOSINOPHIL # BLD AUTO: 0.2 10E3/UL (ref 0–0.7)
EOSINOPHIL NFR BLD AUTO: 2 %
ERYTHROCYTE [DISTWIDTH] IN BLOOD BY AUTOMATED COUNT: 14.2 % (ref 10–15)
FASTING STATUS PATIENT QL REPORTED: ABNORMAL
GLUCOSE SERPL-MCNC: 127 MG/DL (ref 70–99)
HCO3 SERPL-SCNC: 27 MMOL/L (ref 22–29)
HCT VFR BLD AUTO: 43.4 % (ref 40–53)
HGB BLD-MCNC: 15.4 G/DL (ref 13.3–17.7)
IMM GRANULOCYTES # BLD: 0 10E3/UL
IMM GRANULOCYTES NFR BLD: 0 %
LYMPHOCYTES # BLD AUTO: 1.5 10E3/UL (ref 0.8–5.3)
LYMPHOCYTES NFR BLD AUTO: 15 %
MCH RBC QN AUTO: 38.9 PG (ref 26.5–33)
MCHC RBC AUTO-ENTMCNC: 35.5 G/DL (ref 31.5–36.5)
MCV RBC AUTO: 110 FL (ref 78–100)
MONOCYTES # BLD AUTO: 0.6 10E3/UL (ref 0–1.3)
MONOCYTES NFR BLD AUTO: 6 %
NEUTROPHILS # BLD AUTO: 7.6 10E3/UL (ref 1.6–8.3)
NEUTROPHILS NFR BLD AUTO: 77 %
NRBC # BLD AUTO: 0 10E3/UL
NRBC BLD AUTO-RTO: 0 /100
PLATELET # BLD AUTO: 267 10E3/UL (ref 150–450)
POTASSIUM SERPL-SCNC: 3.6 MMOL/L (ref 3.4–5.3)
PROT SERPL-MCNC: 6.7 G/DL (ref 6.4–8.3)
PSA SERPL DL<=0.01 NG/ML-MCNC: <0.01 NG/ML (ref 0–6.5)
RBC # BLD AUTO: 3.96 10E6/UL (ref 4.4–5.9)
SODIUM SERPL-SCNC: 140 MMOL/L (ref 135–145)
WBC # BLD AUTO: 9.9 10E3/UL (ref 4–11)

## 2024-10-18 PROCEDURE — 80061 LIPID PANEL: CPT | Mod: ORL | Performed by: STUDENT IN AN ORGANIZED HEALTH CARE EDUCATION/TRAINING PROGRAM

## 2024-10-18 PROCEDURE — 80053 COMPREHEN METABOLIC PANEL: CPT | Mod: ORL | Performed by: STUDENT IN AN ORGANIZED HEALTH CARE EDUCATION/TRAINING PROGRAM

## 2024-10-18 PROCEDURE — 84153 ASSAY OF PSA TOTAL: CPT | Mod: ORL | Performed by: STUDENT IN AN ORGANIZED HEALTH CARE EDUCATION/TRAINING PROGRAM

## 2024-10-18 PROCEDURE — 85025 COMPLETE CBC W/AUTO DIFF WBC: CPT | Mod: ORL | Performed by: STUDENT IN AN ORGANIZED HEALTH CARE EDUCATION/TRAINING PROGRAM

## 2024-10-19 LAB
CHOLEST SERPL-MCNC: 173 MG/DL
FASTING STATUS PATIENT QL REPORTED: ABNORMAL
HDLC SERPL-MCNC: 30 MG/DL
LDLC SERPL CALC-MCNC: 71 MG/DL
NONHDLC SERPL-MCNC: 143 MG/DL
TRIGL SERPL-MCNC: 360 MG/DL

## 2024-10-28 ENCOUNTER — LAB REQUISITION (OUTPATIENT)
Dept: LAB | Facility: CLINIC | Age: 74
End: 2024-10-28
Payer: COMMERCIAL

## 2024-10-28 DIAGNOSIS — R53.83 OTHER FATIGUE: ICD-10-CM

## 2024-10-28 PROCEDURE — 84443 ASSAY THYROID STIM HORMONE: CPT | Mod: ORL | Performed by: STUDENT IN AN ORGANIZED HEALTH CARE EDUCATION/TRAINING PROGRAM

## 2024-10-29 LAB — TSH SERPL DL<=0.005 MIU/L-ACNC: 1.16 UIU/ML (ref 0.3–4.2)

## 2024-12-10 ENCOUNTER — LAB REQUISITION (OUTPATIENT)
Dept: LAB | Facility: CLINIC | Age: 74
End: 2024-12-10
Payer: COMMERCIAL

## 2024-12-10 DIAGNOSIS — M62.81 MUSCLE WEAKNESS (GENERALIZED): ICD-10-CM

## 2024-12-10 LAB — ERYTHROCYTE [SEDIMENTATION RATE] IN BLOOD BY WESTERGREN METHOD: 9 MM/HR (ref 0–20)

## 2024-12-10 PROCEDURE — 86038 ANTINUCLEAR ANTIBODIES: CPT | Mod: ORL | Performed by: STUDENT IN AN ORGANIZED HEALTH CARE EDUCATION/TRAINING PROGRAM

## 2024-12-10 PROCEDURE — 85652 RBC SED RATE AUTOMATED: CPT | Mod: ORL | Performed by: STUDENT IN AN ORGANIZED HEALTH CARE EDUCATION/TRAINING PROGRAM

## 2024-12-10 PROCEDURE — 82550 ASSAY OF CK (CPK): CPT | Mod: ORL | Performed by: STUDENT IN AN ORGANIZED HEALTH CARE EDUCATION/TRAINING PROGRAM

## 2024-12-11 LAB — CK SERPL-CCNC: 150 U/L (ref 39–308)

## 2024-12-12 LAB — ANA SER QL IF: NEGATIVE

## 2024-12-16 ENCOUNTER — LAB REQUISITION (OUTPATIENT)
Dept: LAB | Facility: CLINIC | Age: 74
End: 2024-12-16
Payer: COMMERCIAL

## 2024-12-16 DIAGNOSIS — R19.7 DIARRHEA, UNSPECIFIED: ICD-10-CM

## 2024-12-16 PROCEDURE — 87507 IADNA-DNA/RNA PROBE TQ 12-25: CPT | Mod: ORL | Performed by: FAMILY MEDICINE

## 2024-12-16 PROCEDURE — 87209 SMEAR COMPLEX STAIN: CPT | Mod: ORL | Performed by: FAMILY MEDICINE

## 2024-12-17 LAB
ADV 40+41 DNA STL QL NAA+NON-PROBE: NEGATIVE
ASTRO TYP 1-8 RNA STL QL NAA+NON-PROBE: NEGATIVE
C CAYETANENSIS DNA STL QL NAA+NON-PROBE: NEGATIVE
CAMPYLOBACTER DNA SPEC NAA+PROBE: NEGATIVE
CRYPTOSP DNA STL QL NAA+NON-PROBE: NEGATIVE
E COLI O157 DNA STL QL NAA+NON-PROBE: ABNORMAL
E HISTOLYT DNA STL QL NAA+NON-PROBE: NEGATIVE
EAEC ASTA GENE ISLT QL NAA+PROBE: NEGATIVE
EC STX1+STX2 GENES STL QL NAA+NON-PROBE: NEGATIVE
EPEC EAE GENE STL QL NAA+NON-PROBE: POSITIVE
ETEC LTA+ST1A+ST1B TOX ST NAA+NON-PROBE: NEGATIVE
G LAMBLIA DNA STL QL NAA+NON-PROBE: NEGATIVE
NOROVIRUS GI+II RNA STL QL NAA+NON-PROBE: NEGATIVE
O+P STL MICRO: NEGATIVE
P SHIGELLOIDES DNA STL QL NAA+NON-PROBE: NEGATIVE
RVA RNA STL QL NAA+NON-PROBE: NEGATIVE
SALMONELLA SP RPOD STL QL NAA+PROBE: POSITIVE
SAPO I+II+IV+V RNA STL QL NAA+NON-PROBE: NEGATIVE
SHIGELLA SP+EIEC IPAH ST NAA+NON-PROBE: NEGATIVE
SPECIMEN TYPE: NORMAL
TRI STN SPEC: NORMAL
TRI STN SPEC: NORMAL
V CHOLERAE DNA SPEC QL NAA+PROBE: NEGATIVE
VIBRIO DNA SPEC NAA+PROBE: NEGATIVE
Y ENTEROCOL DNA STL QL NAA+PROBE: NEGATIVE

## 2025-01-04 LAB
ADV 40+41 DNA STL QL NAA+NON-PROBE: NEGATIVE
ASTRO TYP 1-8 RNA STL QL NAA+NON-PROBE: NEGATIVE
C CAYETANENSIS DNA STL QL NAA+NON-PROBE: NEGATIVE
CAMPYLOBACTER DNA SPEC NAA+PROBE: NEGATIVE
CRYPTOSP DNA STL QL NAA+NON-PROBE: NEGATIVE
E COLI O157 DNA STL QL NAA+NON-PROBE: ABNORMAL
E HISTOLYT DNA STL QL NAA+NON-PROBE: NEGATIVE
EAEC ASTA GENE ISLT QL NAA+PROBE: NEGATIVE
EC STX1+STX2 GENES STL QL NAA+NON-PROBE: NEGATIVE
EPEC EAE GENE STL QL NAA+NON-PROBE: POSITIVE
ETEC LTA+ST1A+ST1B TOX ST NAA+NON-PROBE: NEGATIVE
G LAMBLIA DNA STL QL NAA+NON-PROBE: NEGATIVE
NOROVIRUS GI+II RNA STL QL NAA+NON-PROBE: NEGATIVE
P SHIGELLOIDES DNA STL QL NAA+NON-PROBE: NEGATIVE
RVA RNA STL QL NAA+NON-PROBE: NEGATIVE
SALMONELLA SP RPOD STL QL NAA+PROBE: POSITIVE
SAPO I+II+IV+V RNA STL QL NAA+NON-PROBE: NEGATIVE
SHIGELLA SP+EIEC IPAH ST NAA+NON-PROBE: NEGATIVE
V CHOLERAE DNA SPEC QL NAA+PROBE: NEGATIVE
VIBRIO DNA SPEC NAA+PROBE: NEGATIVE
Y ENTEROCOL DNA STL QL NAA+PROBE: NEGATIVE

## 2025-04-25 ENCOUNTER — LAB REQUISITION (OUTPATIENT)
Dept: LAB | Facility: CLINIC | Age: 75
End: 2025-04-25
Payer: COMMERCIAL

## 2025-04-25 DIAGNOSIS — E78.5 HYPERLIPIDEMIA, UNSPECIFIED: ICD-10-CM

## 2025-04-25 DIAGNOSIS — I10 ESSENTIAL (PRIMARY) HYPERTENSION: ICD-10-CM

## 2025-04-25 LAB
ALBUMIN SERPL BCG-MCNC: 4.7 G/DL (ref 3.5–5.2)
ALP SERPL-CCNC: 63 U/L (ref 40–150)
ALT SERPL W P-5'-P-CCNC: 26 U/L (ref 0–70)
ANION GAP SERPL CALCULATED.3IONS-SCNC: 10 MMOL/L (ref 7–15)
AST SERPL W P-5'-P-CCNC: 25 U/L (ref 0–45)
BASOPHILS # BLD AUTO: 0.1 10E3/UL (ref 0–0.2)
BASOPHILS NFR BLD AUTO: 1 %
BILIRUB SERPL-MCNC: 0.4 MG/DL
BUN SERPL-MCNC: 17.8 MG/DL (ref 8–23)
CALCIUM SERPL-MCNC: 9.5 MG/DL (ref 8.8–10.4)
CHLORIDE SERPL-SCNC: 102 MMOL/L (ref 98–107)
CHOLEST SERPL-MCNC: 175 MG/DL
CREAT SERPL-MCNC: 1.1 MG/DL (ref 0.67–1.17)
EGFRCR SERPLBLD CKD-EPI 2021: 69 ML/MIN/1.73M2
EOSINOPHIL # BLD AUTO: 0.1 10E3/UL (ref 0–0.7)
EOSINOPHIL NFR BLD AUTO: 2 %
ERYTHROCYTE [DISTWIDTH] IN BLOOD BY AUTOMATED COUNT: 15 % (ref 10–15)
FASTING STATUS PATIENT QL REPORTED: YES
FASTING STATUS PATIENT QL REPORTED: YES
GLUCOSE SERPL-MCNC: 107 MG/DL (ref 70–99)
HCO3 SERPL-SCNC: 28 MMOL/L (ref 22–29)
HCT VFR BLD AUTO: 46.1 % (ref 40–53)
HDLC SERPL-MCNC: 40 MG/DL
HGB BLD-MCNC: 15.8 G/DL (ref 13.3–17.7)
IMM GRANULOCYTES # BLD: 0 10E3/UL
IMM GRANULOCYTES NFR BLD: 0 %
LDLC SERPL CALC-MCNC: 103 MG/DL
LYMPHOCYTES # BLD AUTO: 1 10E3/UL (ref 0.8–5.3)
LYMPHOCYTES NFR BLD AUTO: 13 %
MCH RBC QN AUTO: 36.6 PG (ref 26.5–33)
MCHC RBC AUTO-ENTMCNC: 34.3 G/DL (ref 31.5–36.5)
MCV RBC AUTO: 107 FL (ref 78–100)
MONOCYTES # BLD AUTO: 0.5 10E3/UL (ref 0–1.3)
MONOCYTES NFR BLD AUTO: 6 %
NEUTROPHILS # BLD AUTO: 6.3 10E3/UL (ref 1.6–8.3)
NEUTROPHILS NFR BLD AUTO: 78 %
NONHDLC SERPL-MCNC: 135 MG/DL
NRBC # BLD AUTO: 0 10E3/UL
NRBC BLD AUTO-RTO: 0 /100
PLATELET # BLD AUTO: 346 10E3/UL (ref 150–450)
POTASSIUM SERPL-SCNC: 3.9 MMOL/L (ref 3.4–5.3)
PROT SERPL-MCNC: 6.9 G/DL (ref 6.4–8.3)
RBC # BLD AUTO: 4.32 10E6/UL (ref 4.4–5.9)
SODIUM SERPL-SCNC: 140 MMOL/L (ref 135–145)
TRIGL SERPL-MCNC: 159 MG/DL
WBC # BLD AUTO: 8 10E3/UL (ref 4–11)

## 2025-04-25 PROCEDURE — 80061 LIPID PANEL: CPT | Mod: ORL | Performed by: STUDENT IN AN ORGANIZED HEALTH CARE EDUCATION/TRAINING PROGRAM

## 2025-04-25 PROCEDURE — 85025 COMPLETE CBC W/AUTO DIFF WBC: CPT | Mod: ORL | Performed by: STUDENT IN AN ORGANIZED HEALTH CARE EDUCATION/TRAINING PROGRAM

## 2025-04-25 PROCEDURE — 80053 COMPREHEN METABOLIC PANEL: CPT | Mod: ORL | Performed by: STUDENT IN AN ORGANIZED HEALTH CARE EDUCATION/TRAINING PROGRAM
